# Patient Record
Sex: FEMALE | Race: WHITE | Employment: UNEMPLOYED | URBAN - METROPOLITAN AREA
[De-identification: names, ages, dates, MRNs, and addresses within clinical notes are randomized per-mention and may not be internally consistent; named-entity substitution may affect disease eponyms.]

---

## 2017-08-11 ENCOUNTER — OFFICE VISIT (OUTPATIENT)
Dept: INTERNAL MEDICINE CLINIC | Age: 67
End: 2017-08-11

## 2017-08-11 VITALS
HEART RATE: 87 BPM | OXYGEN SATURATION: 99 % | TEMPERATURE: 98 F | BODY MASS INDEX: 25.95 KG/M2 | RESPIRATION RATE: 18 BRPM | HEIGHT: 62 IN | SYSTOLIC BLOOD PRESSURE: 176 MMHG | DIASTOLIC BLOOD PRESSURE: 76 MMHG | WEIGHT: 141 LBS

## 2017-08-11 DIAGNOSIS — Z79.890 HORMONE REPLACEMENT THERAPY: ICD-10-CM

## 2017-08-11 DIAGNOSIS — R29.898 PELVIC GIRDLE WEAKNESS: ICD-10-CM

## 2017-08-11 DIAGNOSIS — B00.1 HERPES LABIALIS: ICD-10-CM

## 2017-08-11 DIAGNOSIS — R19.5 LOOSE STOOLS: ICD-10-CM

## 2017-08-11 DIAGNOSIS — I10 ESSENTIAL HYPERTENSION: ICD-10-CM

## 2017-08-11 DIAGNOSIS — Z23 ENCOUNTER FOR IMMUNIZATION: Primary | ICD-10-CM

## 2017-08-11 DIAGNOSIS — Z87.440 HISTORY OF RECURRENT UTIS: ICD-10-CM

## 2017-08-11 RX ORDER — MEDROXYPROGESTERONE ACETATE 2.5 MG/1
2.5 TABLET ORAL DAILY
COMMUNITY

## 2017-08-11 RX ORDER — ESTRADIOL 0.5 MG/1
TABLET ORAL DAILY
COMMUNITY

## 2017-08-11 RX ORDER — LOSARTAN POTASSIUM 100 MG/1
100 TABLET ORAL DAILY
COMMUNITY
Start: 2017-07-18

## 2017-08-11 RX ORDER — CHOLECALCIFEROL (VITAMIN D3) 25 MCG
2000 TABLET,CHEWABLE ORAL DAILY
COMMUNITY

## 2017-08-11 RX ORDER — CLOTRIMAZOLE AND BETAMETHASONE DIPROPIONATE 10; .64 MG/G; MG/G
CREAM TOPICAL
COMMUNITY
Start: 2017-05-24 | End: 2017-10-09

## 2017-08-11 RX ORDER — ESCITALOPRAM OXALATE 10 MG/1
10 TABLET ORAL DAILY
COMMUNITY
Start: 2017-07-12

## 2017-08-11 RX ORDER — ZOLPIDEM TARTRATE 10 MG/1
10 TABLET ORAL ONCE
Status: ON HOLD | COMMUNITY
Start: 2017-07-15 | End: 2018-01-25

## 2017-08-11 RX ORDER — LOSARTAN POTASSIUM 50 MG/1
TABLET ORAL
COMMUNITY
Start: 2017-05-06 | End: 2017-08-11 | Stop reason: ALTCHOICE

## 2017-08-11 RX ORDER — VALACYCLOVIR HYDROCHLORIDE 1 G/1
TABLET, FILM COATED ORAL DAILY
COMMUNITY
Start: 2017-05-22

## 2017-08-11 RX ORDER — CEPHALEXIN 250 MG/1
CAPSULE ORAL
Refills: 0 | COMMUNITY
Start: 2017-05-10 | End: 2017-10-09

## 2017-08-11 NOTE — LETTER
9/7/2017 7:53 AM 
 
Ms. Maddox [address] [facility] [address] [address] Dear Varsha: 
 
Please find your most recent results below. Resulted Orders CK Result Value Ref Range Creatine Kinase,Total 289 (H) 24 - 173 U/L Narrative Performed at:  [address]  [id number] : Rose Mary Han MD, Phone:  [phone] ALDOLASE Result Value Ref Range Aldolase 4.7 3.3 - 10.3 U/L Narrative Performed at:  [address]  [id number] : Rose Mary Han MD, Phone:  [phone] SED RATE (ESR) Result Value Ref Range Sed rate (ESR) 3 0 - 40 mm/hr Narrative Performed at:  [address]  [id number] : Rose Mary Han MD, Phone:  [phone] RECOMMENDATIONS: 
We have attempted to call you a few times. Please see the note below. The CK muscle enzyme breakdown is slightly elevated and therefore I recommend you to see a  neurologist for further investigation of possible myopathy-.  
 
Please call me if you have any questions: [phone] Sincerely, Jaleesa Pedraza MD

## 2017-08-11 NOTE — PATIENT INSTRUCTIONS
-Purchase a blood pressure cuff that goes around your upper arm and check blood pressure at least 3 times per week. Write down your numbers for review. Check blood pressure in the morning and evening. Rest for 5 minutes with feet elevated and arm resting on a table (at mid-chest level) when checking blood pressure    -Exercise 30-60 minutes most days of the week, preferably with a mix of cardiovascular and strength training. Exercise can improve blood pressure, even if you do not lose weight!    -Limit alcohol intake to less than 2-3 drinks daily     -Avoid tobacco products    -Avoid caffeine, energy drinks, stimulants    -DASH diet - includes fruits, vegetables, fiber, and less than 2000 mg sodium (salt) daily. Try to eat less than 8605-3333 calories daily.  Limit fat intake.     -Avoid non-steroidal inflammatory medications (NSAIDS) such as ibuprofen, advil, motrin, aleve, and naproxyn as these may increase blood pressure if used long-term    -Avoid OTC decongestants such as pseudopherine, phenylephrine, Afrin    -------------------------  Take probiotics

## 2017-08-11 NOTE — PROGRESS NOTES
Immunization administered 8/11/2017 by Deandre Isidro LPN with guardian's consent. Patient tolerated procedure well. No reactions noted.

## 2017-08-11 NOTE — PROGRESS NOTES
Ms. Steven Bender is a new patient who is here to establish care. CC:  Establish Care (new patient)  Hypertension  Changes in bowel movement  Depression  Difficulty going up the stairs     HPI:    HTN: at age of 62 started on blood pressure medication. Sent to cardiologist due to abnormal EKG. Has a RBBB. Had a recent stress test - persantine and had a normal stress test.  Dr James Sick against adding another blood pressure medication  Losartan 100mg daily and tolerating medication well  Does not check blood pressure at home   176/76 today  At urologist [facility] week 133/69  Discussed DASH diet  Patient does lifestyle changes - exercise ( walks) and eats low-salt healthy /    Chronic UTIs: Saw urologist. Sex related UTIs. Takes keflex as needed. Takes cranberry  Significant other lives in [place] half of the year and no UTIs while he is awake  Followed by Dr Zi Robbins - has cystoscopy planned     lexapro 10mg : started with depression . Started on early 46s and helped tremendously. Situational depression. 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 Has tried to go off of it and cannot. Has vaginal US scheduled - and normal  Lawyer Friedman is gynecologist      Trouble swallowing at times: triggered by not chewing or taking a big pill, at times \" a little bile comes up\" and then wont happen for months. Bowels have changed: every morning has loose bowel no matter what eats. Can also have a normal bowel. Always gone by non. Change in the last 9 months ago and increasing  No abdominal pain weight loss, or blood in the stool  Normal colonoscopy 4 years ago. Mother had colon cancer   On chronic antibiotics - may have bacterial overgrowth     AT times struggles with going up the stairs: feels stiffness and pain in buttocks -able to get up from a chair without assistance.      Review of systems:  Constitutional: negative for fever, chills, weight loss, night sweats   Eyes : negative for vision changes, eye pain and discharge  Nose and Throat: negative for tinnitus, sore throat   Cardiovascular: negative for chest pain, palpitations and shortness of breath  Respiratory: negative for shortness of breath, cough and wheezing   Gastroinstestinal: See HPI  Musculoskeletal: See HPI  Genitourinary: negative for dysuria, nocturia, polyuria and hematuria   Neurologic: Negative for focal weakness, numbness or incoordination  Skin: negative for rash, pruritus  Hematologic: negative for easy bruising      Past Medical History:   Diagnosis Date    Herpes labialis     History of recurrent UTIs     sex related    Hormone replacement therapy     HTN (hypertension)     RBBB         History reviewed. No pertinent surgical history. Not on File    No current outpatient prescriptions on file prior to visit. No current facility-administered medications on file prior to visit. family history includes Cancer in her mother. Social History     Social History    Marital status:      Spouse name: N/A    Number of children: N/A    Years of education: N/A     Occupational History    Not on file. Social History Main Topics    Smoking status: Never Smoker    Smokeless tobacco: Never Used    Alcohol use Yes    Drug use: No    Sexual activity: Yes     Partners: Male     Other Topics Concern    Not on file     Social History Narrative    No narrative on file       Visit Vitals    /76 (BP 1 Location: Right arm, BP Patient Position: Sitting)    Pulse 87    Temp 98 °F (36.7 °C) (Oral)    Resp 18    Ht 5' 2\" (1.575 m)    Wt 141 lb (64 kg)    SpO2 99%    BMI 25.79 kg/m2     General:  Well appearing female no acute distress  HEENT:   PERRL,normal conjunctiva. External ear and canals normal, TMs normal.  Hearing normal to voice. Nose without edema or discharge, normal septum. Lips, teeth, gums normal.  Oropharynx: no erythema, no exudates, no lesions, normal tongue. Neck:  Supple. Thyroid normal size, nontender, without nodules. No carotid bruit. No masses or lymphadenopathy  Respiratory: no respiratory distress,  no wheezing, no rhonchi, no rales. No chest wall tenderness. Cardiovascular:  RRR, normal S1S2, no murmur. Gastrointestinal: normal bowel sounds, soft, nontender, without masses. No hepatosplenomegaly. Extremities +2 pulses, no edema, normal sensation   Musculoskeletal:  Normal gait. Normal digits and nails. Normal strength and tone, no atrophy, and no abnormal movement. Skin:  No rash, no lesions, no ulcers. Skin warm, normal turgor, without induration or nodules. Neuro:  A and OX4, fluent speech, cranial nerves normal 2-12. Sensation normal to light touch. DTR symmetrical  Able to get up from the chair without difficulty  Psych:  Normal affect      No results found for: WBC, WBCLT, HGBPOC, HGB, HGBP, HCTPOC, HCT, PHCT, RBCH, PLT, MCV, HGBEXT, HCTEXT, PLTEXT, HGBEXT, HCTEXT, PLTEXT  No results found for: NA, K, CL, CO2, AGAP, GLU, BUN, CREA, BUCR, GFRAA, GFRNA, CA, GFRAA  No results found for: CHOL, CHOLPOCT, CHOLX, CHLST, CHOLV, HDL, HDLPOC, LDL, LDLCPOC, LDLC, DLDLP, VLDLC, VLDL, TGLX, TRIGL, TRIGP, TGLPOCT, CHHD, CHHDX  No results found for: TSH, TSH2, TSH3, TSHP, TSHEXT, TSHEXT  No results found for: HBA1C, HGBE8, WPN6DPCK, CVI5PPSU, HVE3FLPN  No results found for: VITD3, XQVID2, XQVID3, XQVID, VD3RIA      [facility] was normal   Glucose 83   3 D mammogram done on Wednesday   Colonoscopy done last one 2012 and normal                Assessment and Plan:     1. History of recurrent UTIs associated with sexual intercourse  -Takes Keflex as needed. -Denies current symptoms of UTI  -Has a cystoscopy scheduled with urologist    2.  Encounter for immunization  - varicella zoster vacine live (ZOSTAVAX) 19,400 unit/0.65 mL susr injection; 1 Vial by SubCUTAneous route once for 1 dose. Dispense: 0.65 mL; Refill: 0  - PNEUMOCOCCAL CONJ VACCINE 13 VALENT IM  - IL IMMUNIZ ADMIN,1 SINGLE/COMB VAC/TOXOID    3. Essential hypertension  -Blood pressure is elevated today but reports it was normal at urologist office last week  -recommend checking blood pressure with goal of less than  130/85 on average. Follow lo sodium diet. Given DASH diet guidelines. Recommend cardiovascular exercise regularly. Work on weight reduction. Call with blood pressure readings. 4. Hormone replacement therapy  -Prescribed by gynecologist/patient aware of increased risk of clots    5. Herpes labialis  -Takes Valtrex as needed  -Discussed she must not of taking Valtrex in the week prior to getting shingles vaccine    6. Loose stools  -Family history maternal of colon cancer at the age of 76. Patient reports normal colonoscopy 4 years ago/ given change in stool habits I would like her to see a gastroenterologist.  Possibly bacterial overgrowth given recurrent use of Keflex for UTI  - REFERRAL TO GASTROENTEROLOGY    7. Pelvic girdle weakness  -No weakness noted on exam the patient has difficulty going up the stairs. Obtaining labs to rule out myopathy  - CK  - ALDOLASE  - SED RATE (ESR)  - REFERRAL TO PHYSICAL THERAPY    I have requested recent labs done at gyn office and have requested recent mammogram  and last colonoscopy    I spent 60 minutes in the care of this patient with greater than 30 minutes was spent face-to-face with patient discussing medical problems and plan of care  Follow-up Disposition:  Return in about 6 months (around 2/11/2018) for pelvic girdle weakness, HTN .      Haleigh Steele MD

## 2017-08-11 NOTE — MR AVS SNAPSHOT
Visit Information Date & Time Provider Department Dept. Phone Encounter #  
 8/11/2017 11:30 AM Salena Biswas [address] [place] [address],[address] [phone] [id number] Follow-up Instructions Return in about 6 months (around 2/11/2018) for pelvic girdle weakness, HTN . Upcoming Health Maintenance Date Due  
 BREAST CANCER SCRN MAMMOGRAM 9/7/2001 FOBT Q 1 YEAR AGE 50-75 9/7/2001 ZOSTER VACCINE AGE 60> 7/7/2011 GLAUCOMA SCREENING Q2Y 9/7/2016 OSTEOPOROSIS SCREENING (DEXA) 9/7/2016 Pneumococcal 65+ Low/Medium Risk (2 of 2 - PPSV23) 9/7/2016 MEDICARE YEARLY EXAM 9/7/2016 INFLUENZA AGE 9 TO ADULT 8/1/2017 DTaP/Tdap/Td series (2 - Td) 8/10/2022 Allergies as of 8/11/2017  Review Complete On: 8/11/2017 By: Chelsi Leon Not on File Current Immunizations  Never Reviewed Name Date Pneumococcal Conjugate (PCV-13)  Incomplete Not reviewed this visit You Were Diagnosed With   
  
 Codes Comments Encounter for immunization    -  Primary ICD-10-CM: Z25 ICD-9-CM: V03.89 History of recurrent UTIs     ICD-10-CM: Z87.440 ICD-9-CM: V13.02 Essential hypertension     ICD-10-CM: I10 
ICD-9-CM: 401.9 Hormone replacement therapy     ICD-10-CM: P85.065 ICD-9-CM: V07.4 Herpes labialis     ICD-10-CM: B00.1 ICD-9-CM: 054.9 Loose stools     ICD-10-CM: R19.5 ICD-9-CM: 787.7 Pelvic girdle weakness     ICD-10-CM: R29.898 ICD-9-CM: 719.65 Vitals BP Pulse Temp Resp Height(growth percentile) Weight(growth percentile) 176/76 (BP 1 Location: Right arm, BP Patient Position: Sitting) 87 98 °F (36.7 °C) (Oral) 18 5' 2\" (1.575 m) 141 lb (64 kg) SpO2 BMI OB Status Smoking Status 99% 25.79 kg/m2 Menopause Never Smoker BMI and BSA Data Body Mass Index Body Surface Area 25.79 kg/m 2 1.67 m 2 Your Updated Medication List  
  
   
This list is accurate as of: 8/11/17 12:41 PM.  Always use your most recent med list.  
  
  
  
  
 cephALEXin 250 mg capsule Commonly known as:  Liliana Alvarez TK ONE C PO  ONCE D  
  
 clotrimazole-betamethasone topical cream  
Commonly known as:  LOTRISONE  
  
 cranberry extract 450 mg Tab tablet Take 450 mg by mouth two (2) times a day. cyanocobalamin (vitamin B-12) 3,000 mcg Lozg  
by SubLINGual route. escitalopram oxalate 10 mg tablet Commonly known as:  Louvella Milo Take 10 mg by mouth daily. estradiol 0.5 mg tablet Commonly known as:  ESTRACE Take  by mouth daily. losartan 100 mg tablet Commonly known as:  COZAAR Take 100 mg by mouth daily. medroxyPROGESTERone 2.5 mg tablet Commonly known as:  PROVERA Take 2.5 mg by mouth daily. triamcinolone-dimethicone 0.1-5 % Ktoc  
by Apply Externally route. valACYclovir 1 gram tablet Commonly known as:  VALTREX  
daily. varicella zoster vacine live 19,400 unit/0.65 mL Susr injection Commonly known as:  ZOSTAVAX  
1 Vial by SubCUTAneous route once for 1 dose. zolpidem 10 mg tablet Commonly known as:  AMBIEN Take 10 mg by mouth once. Prescriptions Printed Refills  
 varicella zoster vacine live (ZOSTAVAX) 19,400 unit/0.65 mL susr injection 0 Si Vial by SubCUTAneous route once for 1 dose. Class: Print Route: SubCUTAneous We Performed the Following ALDOLASE [id number] CPT(R)] CK [id number] CPT(R)] PNEUMOCOCCAL CONJ VACCINE 13 VALENT IM [id number] CPT(R)] MD IMMUNIZ ADMIN,1 SINGLE/COMB VAC/TOXOID [id number] CPT(R)] REFERRAL TO GASTROENTEROLOGY [MFY97 Custom] Comments:  
 Please evaluate patient for  Increased frequency stools. Mother hx of colon cancer REFERRAL TO PHYSICAL THERAPY [GLI53 Custom] SED RATE (ESR) [id number] CPT(R)] Follow-up Instructions Return in about 6 months (around 2018) for pelvic girdle weakness, HTN . Referral Information Referral ID Referred By Referred To [id number] APPANUSHA Segura Not Available Visits Status Start Date End Date 1 New Request 8/11/17 8/11/18 If your referral has a status of pending review or denied, additional information will be sent to support the outcome of this decision. Referral ID Referred By Referred To  
 [id number] APPAgnes Hemphill 1, [address] [facility] [address] , [address] Phone: [phone] Fax: [phone] Visits Status Start Date End Date 1 New Request 8/11/17 8/11/18 If your referral has a status of pending review or denied, additional information will be sent to support the outcome of this decision. Patient Instructions   
-Purchase a blood pressure cuff that goes around your upper arm and check blood pressure at least 3 times per week. Write down your numbers for review. Check blood pressure in the morning and evening. Rest for 5 minutes with feet elevated and arm resting on a table (at mid-chest level) when checking blood pressure 
 
-Exercise 30-60 minutes most days of the week, preferably with a mix of cardiovascular and strength training. Exercise can improve blood pressure, even if you do not lose weight! 
 
-Limit alcohol intake to less than 2-3 drinks daily -Avoid tobacco products -Avoid caffeine, energy drinks, stimulants -DASH diet - includes fruits, vegetables, fiber, and less than 2000 mg sodium (salt) daily. Try to eat less than 4602-2566 calories daily. Limit fat intake.  
 
-Avoid non-steroidal inflammatory medications (NSAIDS) such as ibuprofen, advil, motrin, aleve, and naproxyn as these may increase blood pressure if used long-term 
 
-Avoid OTC decongestants such as pseudopherine, phenylephrine, Afrin 
 
------------------------- Take probiotics Introducing hospitals & HEALTH SERVICES!    
 [employer] introduces [employer] patient portal. Now you can access parts of your medical record, email your doctor's office, and request medication refills online. 1. In your internet browser, go to [email or web address] 2. Click on the First Time User? Click Here link in the Sign In box. You will see the New Member Sign Up page. 3. Enter your [employer] Access Code exactly as it appears below. You will not need to use this code after youve completed the sign-up process. If you do not sign up before the expiration date, you must request a new code. · [employer] Access Code: [email or web address] Expires: 11/9/2017 12:41 PM 
 
4. Enter the last four digits of your Social Security Number (xxxx) and Date of Birth (mm/dd/yyyy) as indicated and click Submit. You will be taken to the next sign-up page. 5. Create a MoonClerk ID. This will be your [employer]erk login ID and cannot be changed, so think of one that is secure and easy to remember. 6. Create a [employer]erk password. You can change your password at any time. 7. Enter your Password Reset Question and Answer. This can be used at a later time if you forget your password. 8. Enter your e-mail address. You will receive e-mail notification when new information is available in [address] 9. Click Sign Up. You can now view and download portions of your medical record. 10. Click the Download Summary menu link to download a portable copy of your medical information. If you have questions, please visit the Frequently Asked Questions section of the [employer] website. Remember, [employer] is NOT to be used for urgent needs. For medical emergencies, dial 911. Now available from your iPhone and Android! Please provide this summary of care documentation to your next provider. Your primary care clinician is listed as FELY HILL [address] [place] [address] Ave. If you have any questions after today's visit, please call [phone].

## 2017-08-11 NOTE — PROGRESS NOTES
Chief Complaint   Patient presents with   [facility] Establish Care     new patient     1. Have you been to the ER, urgent care clinic since your last visit? Hospitalized since your last visit? No    2. Have you seen or consulted any other health care providers outside of the [facility] since your last visit? Include any pap smears or colon screening.  No

## 2017-08-15 ENCOUNTER — APPOINTMENT (OUTPATIENT)
Dept: INTERNAL MEDICINE CLINIC | Age: 67
End: 2017-08-15

## 2017-08-15 ENCOUNTER — HOSPITAL ENCOUNTER (OUTPATIENT)
Dept: LAB | Age: 67
Discharge: HOME | End: 2017-08-15
Payer: MEDICARE

## 2017-08-15 PROCEDURE — 85651 RBC SED RATE NONAUTOMATED: CPT

## 2017-08-15 PROCEDURE — 82085 ASSAY OF ALDOLASE: CPT

## 2017-08-15 PROCEDURE — 82550 ASSAY OF CK (CPK): CPT

## 2017-08-15 PROCEDURE — 36415 COLL VENOUS BLD VENIPUNCTURE: CPT

## 2017-08-16 LAB
ALDOLASE SERPL-CCNC: 4.7 U/L (ref 3.3–10.3)
CK SERPL-CCNC: 289 U/L (ref 24–173)
ERYTHROCYTE [SEDIMENTATION RATE] IN BLOOD BY WESTERGREN METHOD: 3 MM/HR (ref 0–40)

## 2017-08-16 NOTE — PROGRESS NOTES
Please notify patient that CK muscle enzyme breakdown is slightly elevated and therefore I recommend patient to see neurologist for further investigation of possible myopathy- as patient complained of difficulty going up the steps during the visit.

## 2017-10-09 ENCOUNTER — OFFICE VISIT (OUTPATIENT)
Dept: NEUROLOGY | Age: 67
End: 2017-10-09

## 2017-10-09 VITALS
BODY MASS INDEX: 26.61 KG/M2 | DIASTOLIC BLOOD PRESSURE: 80 MMHG | SYSTOLIC BLOOD PRESSURE: 138 MMHG | HEIGHT: 62 IN | OXYGEN SATURATION: 98 % | WEIGHT: 144.6 LBS

## 2017-10-09 DIAGNOSIS — M62.81 PROXIMAL LIMB MUSCLE WEAKNESS: Primary | ICD-10-CM

## 2017-10-09 NOTE — PATIENT INSTRUCTIONS
1. MRI lumbar spine  2. Blood work  3. Scheduled for EMG/nerve conduction study       A Healthy Lifestyle: Care Instructions  Your Care Instructions  A healthy lifestyle can help you feel good, stay at a healthy weight, and have plenty of energy for both work and play. A healthy lifestyle is something you can share with your whole family. A healthy lifestyle also can lower your risk for serious health problems, such as high blood pressure, heart disease, and diabetes. You can follow a few steps listed below to improve your health and the health of your family. Follow-up care is a key part of your treatment and safety. Be sure to make and go to all appointments, and call your doctor if you are having problems. Its also a good idea to know your test results and keep a list of the medicines you take. How can you care for yourself at home? · Do not eat too much sugar, fat, or fast foods. You can still have dessert and treats now and then. The goal is moderation. · Start small to improve your eating habits. Pay attention to portion sizes, drink less juice and soda pop, and eat more fruits and vegetables. ¨ Eat a healthy amount of food. A 3-ounce serving of meat, for example, is about the size of a deck of cards. Fill the rest of your plate with vegetables and whole grains. ¨ Limit the amount of soda and sports drinks you have every day. Drink more water when you are thirsty. ¨ Eat at least 5 servings of fruits and vegetables every day. It may seem like a lot, but it is not hard to reach this goal. A serving or helping is 1 piece of fruit, 1 cup of vegetables, or 2 cups of leafy, raw vegetables. Have an apple or some carrot sticks as an afternoon snack instead of a candy bar. Try to have fruits and/or vegetables at every meal.  · Make exercise part of your daily routine. You may want to start with simple activities, such as walking, bicycling, or slow swimming. Try to be active 30 to 60 minutes every day.  You do not need to do all 30 to 60 minutes all at once. For example, you can exercise 3 times a day for 10 or 20 minutes. Moderate exercise is safe for most people, but it is always a good idea to talk to your doctor before starting an exercise program.  · Keep moving. Mackenzie Knuckles the lawn, work in the garden, or Lela. Take the stairs instead of the elevator at work. · If you smoke, quit. People who smoke have an increased risk for heart attack, stroke, cancer, and other lung illnesses. Quitting is hard, but there are ways to boost your chance of quitting tobacco for good. ¨ Use nicotine gum, patches, or lozenges. ¨ Ask your doctor about stop-smoking programs and medicines. ¨ Keep trying. In addition to reducing your risk of diseases in the future, you will notice some benefits soon after you stop using tobacco. If you have shortness of breath or asthma symptoms, they will likely get better within a few weeks after you quit. · Limit how much alcohol you drink. Moderate amounts of alcohol (up to 2 drinks a day for men, 1 drink a day for women) are okay. But drinking too much can lead to liver problems, high blood pressure, and other health problems. Family health  If you have a family, there are many things you can do together to improve your health. · Eat meals together as a family as often as possible. · Eat healthy foods. This includes fruits, vegetables, lean meats and dairy, and whole grains. · Include your family in your fitness plan. Most people think of activities such as jogging or tennis as the way to fitness, but there are many ways you and your family can be more active. Anything that makes you breathe hard and gets your heart pumping is exercise. Here are some tips:  ¨ Walk to do errands or to take your child to school or the bus. ¨ Go for a family bike ride after dinner instead of watching TV. Where can you learn more? Go to http://patricia-nish.info/.   Enter E668 in the search box to learn more about \"A Healthy Lifestyle: Care Instructions. \"  Current as of: July 26, 2016  Content Version: 11.3  © 4337-6133 [employer]. Care instructions adapted under license by [employer] (which disclaims liability or warranty for this information). If you have questions about a medical condition or this instruction, always ask your healthcare professional. [address] any warranty or liability for your use of this information.

## 2017-10-09 NOTE — MR AVS SNAPSHOT
Visit Information Date & Time Provider Department Dept. Phone Encounter #  
 10/9/2017  9:00 AM Refugio Tyson MD [facility]rology [facility] at [facility] [phone] [id number] Upcoming Health Maintenance Date Due FOBT Q 1 YEAR AGE 50-75 9/7/2001 ZOSTER VACCINE AGE 60> 7/7/2011 GLAUCOMA SCREENING Q2Y 9/7/2016 OSTEOPOROSIS SCREENING (DEXA) 9/7/2016 MEDICARE YEARLY EXAM 9/7/2016 INFLUENZA AGE 9 TO ADULT 8/1/2017 Pneumococcal 65+ Low/Medium Risk (2 of 2 - PPSV23) 8/11/2018 BREAST CANCER SCRN MAMMOGRAM 8/11/2019 DTaP/Tdap/Td series (2 - Td) 8/10/2022 Allergies as of 10/9/2017  Review Complete On: 10/9/2017 By: Refugio Tyson MD  
  
 Severity Noted Reaction Type Reactions Sulfa (Sulfonamide Antibiotics)  10/09/2017    Itching Current Immunizations  Never Reviewed Name Date Pneumococcal Conjugate (PCV-13) 8/11/2017  1:01 PM  
  
 Not reviewed this visit You Were Diagnosed With   
  
 Codes Comments Proximal limb muscle weakness    -  Primary ICD-10-CM: M62.89 ICD-9-CM: 728.87 Vitals BP Height(growth percentile) Weight(growth percentile) SpO2 BMI OB Status 138/80 5' 2\" (1.575 m) 144 lb 9.6 oz (65.6 kg) 98% 26.45 kg/m2 Menopause Smoking Status Never Smoker BMI and BSA Data Body Mass Index Body Surface Area  
 26.45 kg/m 2 1.69 m 2 Preferred Pharmacy Pharmacy Name Phone [facility] [address] [place] [address] [phone] Your Updated Medication List  
  
   
This list is accurate as of: 10/9/17 10:11 AM.  Always use your most recent med list.  
  
  
  
  
 cranberry extract 450 mg Tab tablet Take 450 mg by mouth two (2) times a day. cyanocobalamin (vitamin B-12) 3,000 mcg Lozg  
by SubLINGual route. escitalopram oxalate 10 mg tablet Commonly known as:  Arty [place] Take 10 mg by mouth daily. estradiol 0.5 mg tablet Commonly known as:  ESTRACE Take  by mouth daily. losartan 100 mg tablet Commonly known as:  COZAAR Take 100 mg by mouth daily. medroxyPROGESTERone 2.5 mg tablet Commonly known as:  PROVERA Take 2.5 mg by mouth daily. triamcinolone-dimethicone 0.1-5 % Ktoc  
by Apply Externally route. valACYclovir 1 gram tablet Commonly known as:  VALTREX  
daily. zolpidem 10 mg tablet Commonly known as:  AMBIEN Take 10 mg by mouth once. We Performed the Following CK [id number] CPT(R)] MYASTHENIA GRAVIS EVALUATION [id number] CPT(R)] To-Do List   
 10/09/2017 Imaging:  MRI LUMB SPINE WO CONT Patient Instructions 1. MRI lumbar spine 2. Blood work 3. Scheduled for EMG/nerve conduction study A Healthy Lifestyle: Care Instructions Your Care Instructions A healthy lifestyle can help you feel good, stay at a healthy weight, and have plenty of energy for both work and play. A healthy lifestyle is something you can share with your whole family. A healthy lifestyle also can lower your risk for serious health problems, such as high blood pressure, heart disease, and diabetes. You can follow a few steps listed below to improve your health and the health of your family. Follow-up care is a key part of your treatment and safety. Be sure to make and go to all appointments, and call your doctor if you are having problems. Its also a good idea to know your test results and keep a list of the medicines you take. How can you care for yourself at home? · Do not eat too much sugar, fat, or fast foods. You can still have dessert and treats now and then. The goal is moderation. · Start small to improve your eating habits. Pay attention to portion sizes, drink less juice and soda pop, and eat more fruits and vegetables. ¨ Eat a healthy amount of food.  A 3-ounce serving of meat, for example, is about the size of a deck of cards. Fill the rest of your plate with vegetables and whole grains. ¨ Limit the amount of soda and sports drinks you have every day. Drink more water when you are thirsty. ¨ Eat at least 5 servings of fruits and vegetables every day. It may seem like a lot, but it is not hard to reach this goal. A serving or helping is 1 piece of fruit, 1 cup of vegetables, or 2 cups of leafy, raw vegetables. Have an apple or some carrot sticks as an afternoon snack instead of a candy bar. Try to have fruits and/or vegetables at every meal. 
· Make exercise part of your daily routine. You may want to start with simple activities, such as walking, bicycling, or slow swimming. Try to be active 30 to 60 minutes every day. You do not need to do all 30 to 60 minutes all at once. For example, you can exercise 3 times a day for 10 or 20 minutes. Moderate exercise is safe for most people, but it is always a good idea to talk to your doctor before starting an exercise program. 
· Keep moving. Maytommy [employer]Â®s the lawn, work in the garden, or [employer]. Take the stairs instead of the elevator at work. · If you smoke, quit. People who smoke have an increased risk for heart attack, stroke, cancer, and other lung illnesses. Quitting is hard, but there are ways to boost your chance of quitting tobacco for good. ¨ Use nicotine gum, patches, or lozenges. ¨ Ask your doctor about stop-smoking programs and medicines. ¨ Keep trying. In addition to reducing your risk of diseases in the future, you will notice some benefits soon after you stop using tobacco. If you have shortness of breath or asthma symptoms, they will likely get better within a few weeks after you quit. · Limit how much alcohol you drink. Moderate amounts of alcohol (up to 2 drinks a day for men, 1 drink a day for women) are okay. But drinking too much can lead to liver problems, high blood pressure, and other health problems. Family health If you have a family, there are many things you can do together to improve your health. · Eat meals together as a family as often as possible. · Eat healthy foods. This includes fruits, vegetables, lean meats and dairy, and whole grains. · Include your family in your fitness plan. Most people think of activities such as jogging or tennis as the way to fitness, but there are many ways you and your family can be more active. Anything that makes you breathe hard and gets your heart pumping is exercise. Here are some tips: 
¨ Walk to do errands or to take your child to school or the bus. ¨ Go for a family bike ride after dinner instead of watching TV. Where can you learn more? Go to http://patricia-nish.info/. Enter V370 in the search box to learn more about \"A Healthy Lifestyle: Care Instructions. \" Current as of: July 26, 2016 Content Version: 11.3 © 3897-8412 [employer]. Care instructions adapted under license by [employer] (which disclaims liability or warranty for this information). If you have questions about a medical condition or this instruction, always ask your healthcare professional. [place] [address] any warranty or liability for your use of this information. Introducing [facility]! Sachin Webster introduces ImpactRx patient portal. Now you can access parts of your medical record, email your doctor's office, and request medication refills online. 1. In your internet browser, go to [email or web address] 2. Click on the First Time User? Click Here link in the Sign In box. You will see the New Member Sign Up page. 3. Enter your ImpactRx Access Code exactly as it appears below. You will not need to use this code after youve completed the sign-up process. If you do not sign up before the expiration date, you must request a new code. · ImpactRx Access Code: [email or web address] Expires: 11/9/2017 12:41 PM 
 
 4. Enter the last four digits of your Social Security Number (xxxx) and Date of Birth (mm/dd/yyyy) as indicated and click Submit. You will be taken to the next sign-up page. 5. Create a Cheggin ID. This will be your Cheggin login ID and cannot be changed, so think of one that is secure and easy to remember. 6. Create a Cheggin password. You can change your password at any time. 7. Enter your Password Reset Question and Answer. This can be used at a later time if you forget your password. 8. Enter your e-mail address. You will receive e-mail notification when new information is available in [address] 9. Click Sign Up. You can now view and download portions of your medical record. 10. Click the Download Summary menu link to download a portable copy of your medical information. If you have questions, please visit the Frequently Asked Questions section of the Cheggin website. Remember, Cheggin is NOT to be used for urgent needs. For medical emergencies, dial 911. Now available from your iPhone and Android! Please provide this summary of care documentation to your next provider. Your primary care clinician is listed as FELY HILL [address] [place], [place] [address]. If you have any questions after today's visit, please call [phone].

## 2017-10-09 NOTE — PROGRESS NOTES
Neurology Consultation Note  REFERRED BY:  MD Zulay    10/09/17    Chief Complaint   Patient presents with    New Patient     gridele muscle weakness/elevated CK       HISTORY OF PRESENT ILLNESS  Nancy Lee is a 78 y.o. female who presented to the neurology office for management of progressive weakness in proximal muscles of the lower extremities. The patient states that she used to live on the third floor around 3 years ago but started noticing some weakness 2 years ago and in the last 1 year it has gotten even more significant. It has been gradually progressing. She does not have any problems walking straight on a level floor but when she has to go up the stairs she starts having strain in her lower back and she has to pull herself up using the handrail. She notices weakness in her lower extremities as well. She denies any history of diabetes and there is no associated numbness in the lower extremities. She did have muscle enzymes and they were marginally elevated. Current Outpatient Prescriptions   Medication Sig    escitalopram oxalate (LEXAPRO) 10 mg tablet Take 10 mg by mouth daily.  losartan (COZAAR) 100 mg tablet Take 100 mg by mouth daily.  valACYclovir (VALTREX) 1 gram tablet daily.  zolpidem (AMBIEN) 10 mg tablet Take 10 mg by mouth once.  estradiol (ESTRACE) 0.5 mg tablet Take  by mouth daily.  medroxyPROGESTERone (PROVERA) 2.5 mg tablet Take 2.5 mg by mouth daily.  cyanocobalamin, vitamin B-12, 3,000 mcg lozg by SubLINGual route.  cranberry extract 450 mg tab tablet Take 450 mg by mouth two (2) times a day. No current facility-administered medications for this visit. Allergies   Allergen Reactions    Sulfa (Sulfonamide Antibiotics) Itching     Past Medical History:   Diagnosis Date    Herpes labialis     History of recurrent UTIs     sex related    Hormone replacement therapy     HTN (hypertension)     RBBB      History reviewed.  No pertinent surgical history. Family History   Problem Relation Age of Onset    Cancer Mother      colon     Social History   Substance Use Topics    Smoking status: Never Smoker    Smokeless tobacco: Never Used    Alcohol use Yes       REVIEW OF SYSTEMS:   A ten system review of constitutional, cardiovascular, respiratory, musculoskeletal, endocrine, skin, SHEENT, genitourinary, psychiatric and neurologic systems was obtained and is unremarkable with the exception of depression, joint pain, muscle pain, muscle weakness, neuropathy, snoring and vertigo     EXAMINATION:   Visit Vitals    /80    Ht 5' 2\" (1.575 m)    Wt 144 lb 9.6 oz (65.6 kg)    SpO2 98%    BMI 26.45 kg/m2        General:   General appearance: Pt is in no acute distress   Distal pulses are preserved  Fundoscopic Exam: Normal    Neurological Examination:   Mental Status: AAO x3. Speech is fluent. Follows commands, has normal fund of knowledge, attention, short term recall, comprehension and insight. Cranial Nerves: Visual fields are full. PERRL, Extraocular movements are full. Facial sensation intact V1- V3. Facial movement intact, symmetric. Hearing intact to conversation. Palate elevates symmetrically. Shoulder shrug symmetric. Tongue midline. Motor: Strength is 5/5 in all 4 ext. No atrophy. Tone: Normal    Sensation: Normal to light touch    Reflexes: DTRs 2+ everywhere except absent at ankles. Plantar responses downgoing. Coordination/Cerebellar: Intact to finger-nose-finger     Gait: Casual gait is normal.     Skin: No significant bruising or lacerations. I also examined her while going up the stairs and she had to make additional effort to go up the stairs and was holding onto her lower back.     Laboratory review:   Results for orders placed or performed in visit on 08/11/17   CK   Result Value Ref Range    Creatine Kinase,Total 289 (H) 24 - 173 U/L   ALDOLASE   Result Value Ref Range    Aldolase 4.7 3.3 - 10.3 U/L   SED RATE (ESR)   Result Value Ref Range    Sed rate (ESR) 3 0 - 40 mm/hr       Imaging review:  8/9/2017  Mammogram  The left implant has new extracapsular silicone. The left implant shows extracapsular rupture. No specific mammographic evidence of malignancy. Documentation review:  I reviewed the notes from the primary care physician from 8/11/2017. The patient does have hypertension. The patient is also complaining of lower extremity weakness. The patient has chronic UTIs. The patient is having trouble swallowing at times. We will do blood work and a 3D mammogram and a colonoscopy. We will start Keflex for recurrent UTI as needed. The patient is having pelvic girdle weakness and has problems going up the stairs. Will get a CK level. And will refer the patient to physical therapy. Assessment/Plan: Leopold Coup is a 78 y.o. female who presented to the neurology office for management of weakness in the proximal muscles of the lower extremities. On examination I did not find any appreciable weakness in the proximal lower extremities. I also took her on the stairs and she was holding onto her back to go up the stairs. She might have mild weakness that is not appreciable on formal testing. I will get MRI of the lumbar spine and an EMG/nerve conduction study to evaluate this further. Also, I have ordered myasthenia panel and will be repeating the CK level. If this workup is negative, we will proceed with proximal lower extremity muscle biopsy to look for any dystrophy/myopathy. ICD-10-CM ICD-9-CM    1. Proximal limb muscle weakness M62.89 728.87 MRI LUMB SPINE WO CONT      MYASTHENIA GRAVIS EVALUATION      CK      Thank you for allowing me to participate in the care of Ms. Weiner. Please feel free to contact me if you have any questions. Anne Galvez MD  Neurologist    CC: Trevon Staton MD  Fax: [phone]    This note was created using voice recognition software. Despite editing, there may be syntax errors. This note will not be viewable in [address]

## 2017-10-11 LAB
ACHR BIND AB SER-SCNC: <0.03 NMOL/L (ref 0–0.24)
CK SERPL-CCNC: 329 U/L (ref 24–173)
STRIA MUS AB TITR SER IF: NEGATIVE {TITER}

## 2017-10-18 ENCOUNTER — TELEPHONE (OUTPATIENT)
Dept: NEUROLOGY | Age: 67
End: 2017-10-18

## 2017-10-19 ENCOUNTER — HOSPITAL ENCOUNTER (OUTPATIENT)
Dept: MRI IMAGING | Age: 67
Discharge: HOME | End: 2017-10-19
Attending: PSYCHIATRY & NEUROLOGY
Payer: MEDICARE

## 2017-10-19 DIAGNOSIS — M62.81 PROXIMAL LIMB MUSCLE WEAKNESS: ICD-10-CM

## 2017-10-19 PROCEDURE — 72148 MRI LUMBAR SPINE W/O DYE: CPT

## 2017-10-20 ENCOUNTER — TELEPHONE (OUTPATIENT)
Dept: NEUROLOGY | Age: 67
End: 2017-10-20

## 2017-10-20 NOTE — TELEPHONE ENCOUNTER
Spoke with patient to let her known Dr. Reyes Sender is working in the hospital and will be back on Monday, and I have sent him a message from Wednesday.

## 2017-10-24 NOTE — TELEPHONE ENCOUNTER
Spoke with patient and per Dr. Cameron Castro he would like to go over your labs and MRI result on November 2, while you are here for your EMG. Patient agreed.

## 2017-10-24 NOTE — TELEPHONE ENCOUNTER
Spoke with patient and per Dr. Vargas Bowens is it okay to go over labs and MRI result on November 2, 2017 when she has her EMG. Patient agreed.

## 2017-10-27 ENCOUNTER — HOSPITAL ENCOUNTER (OUTPATIENT)
Dept: PHYSICAL THERAPY | Age: 67
Discharge: HOME | End: 2017-10-27
Payer: MEDICARE

## 2017-10-27 PROCEDURE — G8978 MOBILITY CURRENT STATUS: HCPCS | Performed by: PHYSICAL THERAPIST

## 2017-10-27 PROCEDURE — 97162 PT EVAL MOD COMPLEX 30 MIN: CPT | Performed by: PHYSICAL THERAPIST

## 2017-10-27 PROCEDURE — 97110 THERAPEUTIC EXERCISES: CPT | Performed by: PHYSICAL THERAPIST

## 2017-10-27 PROCEDURE — 97140 MANUAL THERAPY 1/> REGIONS: CPT | Performed by: PHYSICAL THERAPIST

## 2017-10-27 PROCEDURE — G8979 MOBILITY GOAL STATUS: HCPCS | Performed by: PHYSICAL THERAPIST

## 2017-10-27 NOTE — PROGRESS NOTES
[facility]  [address] ([address]), [address] [place]  Phone: [phone] Fax: [phone]     Plan of Care/Statement of Necessity for Physical Therapy Services  2-15    Patient name: Yariel Rivera  : 1950  Provider#: [id number]  Referral source: Julio Cesar Vaughan MD      Medical/Treatment Diagnosis: r29.898     Prior Hospitalization: see medical history     Comorbidities: DDD, anxiety/panic disorders, high blood pressure  Prior Level of Function: 20 minutes of exercise at least three(3) times a week   Medications: Verified on Patient Summary List  Start of Care: 10/27/2017      Onset Date: chronic (increased symptoms 18 months ago)   The Plan of Care and following information is based on the information from the initial evaluation. Assessment/ key information:The patient presents to clinic with increased low back pain that is most limiting her with stairs and any incline walking. The patient is extremely active and walks up to 3 miles a few times a week and has recently noticed increase fatigue and tiredness in her low back towards the end of her walks. The patient reports decreased SLS balance, hip musculature strength, and stability on the right compared to the left. She presents with right anterior innominate with other signs and symptoms that are consistent with sacroiliac dysfunction. However, the patient also has some inconsistent neurological symptoms that may be due to DDD or other age related degeneration in the lumbar spine. The patient will benefit from guided therapeutic interventions such as therex for strengthening and neuromuscular re-eduction, manual techniques for joint mobility and soft tissue extensibility, and modalities for pain management in order to improve functional mobility with daily activities.       Evaluation Complexity History MEDIUM  Complexity : 1-2 comorbidities / personal factors will impact the outcome/ POC ; Examination MEDIUM Complexity : 3 Standardized tests and measures addressing body structure, function, activity limitation and / or participation in recreation  ;Presentation MEDIUM Complexity : Evolving with changing characteristics  ; Clinical Decision Making MEDIUM Complexity : FOTO score of 26-74  Overall Complexity Rating: MEDIUM    Problem List: pain affecting function, decrease strength, impaired gait/ balance, decrease ADL/ functional abilitiies, decrease activity tolerance and decrease flexibility/ joint mobility   Treatment Plan may include any combination of the following: Therapeutic exercise, Therapeutic activities, Neuromuscular re-education, Physical agent/modality, Gait/balance training, Manual therapy, Patient education, Functional mobility training and Stair training  Patient / Family readiness to learn indicated by: asking questions, trying to perform skills and interest  Persons(s) to be included in education: patient (P)  Barriers to Learning/Limitations: yes;  emotional and other anxiety disorders   Patient Goal (s): \"strengthen pelvic girdle\"  Patient Self Reported Health Status: good  Rehabilitation Potential: good    Short Term Goals: To be accomplish ed in 4 treatments:   Patient will be able to perform SLS > 30 seconds bilaterally in order to improve functional mobility. Patient will be able to walk 3 miles with less then 3/10 pain in order to maintain prior level of function. Long Term Goals: To be accomplished in 16 treatments:   Patient will improve FOTO score to >/= 66. Patient will be able to climb 2 flights of stairs with 0/10 pain and self report of minimal exertion level in order to visit her son's apartment. Patient will be able to stand 2+ hours without pain in order to participate in hobbies like working the [employer] stand. Frequency / Duration: Patient to be seen 2 times per week for 16 treatments.     Patient/ Caregiver education and instruction: activity modification, exercises and other injury pathology     [x]  Plan of care has been reviewed with PTA    G-Codes (GP)  Mobility   Current  CK= 40-59%   Goal  CJ= 20-39%    The severity rating is based on clinical judgment and the FOTO Score. Certification Period: 10/27/17-1/27/18  Jacki Barrera, PT 10/27/2017 1:36 PM    ________________________________________________________________________    I certify that the above Therapy Services are being furnished while the patient is under my care. I agree with the treatment plan and certify that this therapy is necessary.     [de-identified] Signature:____________________  Date:____________Time: _________

## 2017-10-27 NOTE — PROGRESS NOTES
PT INITIAL EVALUATION NOTE - [facility] 2-15    Patient Name: Nanda Dougherty  Date:10/27/2017  : 1950  [x]  Patient  Verified  Payor: Sharmila Lopez / Plan: [place] PART [place] & B / Product Type: Medicare /    In time:12:15pm  Out time: 1:15pm   Total Treatment Time (min): 60  Total Timed Codes (min): 35  1:1 Treatment Time ( [address] only): 60   Visit #: 1     Treatment Area: 9898    SUBJECTIVE  Pain Level (0-10 scale): 0 (7/10 at worst)  Any medication changes, allergies to medications, adverse drug reactions, diagnosis change, or new procedure performed?: [] No    [x] Yes (see summary sheet for update)  Subjective: The patient reports that she is always active and walks frequently, but noticed more difficulty going up inclines. Three years ago she lived on the 3rd floor and she didn't have any difficulty then, but now stairs and inclines are more difficult. It just feels more weak in her back and legs. She really started to notice 18 months ago to have more difficulty, specifically walking up steep Denominational stairs, even initially with the first step. She has to use the handrails to assist. She even has difficulty with house steps. She walks on flat surfaces 3 miles at a time, and only just very recently started to fatigue. She hasn't had any falls. She had blood-work done and had elevated CK levels. She had an MRI recently. She does have history of sciatic issues many years ago but it went away on its own.     OBJECTIVE    Posture:  Scapular protraction bilaterally  Other Observations:  Difficulty heelwalking  Gait and Functional Mobility:  Internal rotation bilaterally (left more than right) during heel strike; occasional scissoring  Palpation: slight tenderness to bilaterally lumbar paraspinals        Lumbar AROM:          R  L    Flexion    100%      Extension   100%      Side Bending   100%  100%    Rotation   100%  100%        LOWER QUARTER   MUSCLE STRENGTH  KEY       R  L  0 - No Contraction  L1, L2 Psoas  4-  4  1 - Trace   L3 Quads  5  5  2 - Poor   L4 Tib Ant  5  5  3 - Fair    L5 EHL  4  5  4 - Good   S1 Peroneals  5  5  5 - Normal   S2 Hams  5  5    Flexibility: tight hip flexors (R more than left)  Mobility Assessment: slight tightness with PROM IR on right hip versus left      MMT:               HIP Ext: 3+/5 on right; 4/5 on left              HIP Abd: 4-/5 on right (p! In right lumbar/SI); 4+/5 on left  Neurological: Reflexes / Sensations: +1 quad bilaterally (hypo); +1 achilles bilaterally  Special Tests:    FABERS: neg   Slump: neg   H.S. SLR: neg        Long Sit: + right anteriorly rotated innominate            25 min Therapeutic Exercise:  [x] See flow sheet :   Rationale: increase ROM, increase strength and improve coordination to improve the patients ability to perform daily activities. 10 min Manual Therapy: MET for right anterior innominate; shotgun pelvis MET     Rationale: decrease pain, increase tissue extensibility and improve functional alignment  to improve the patients ability to perform daily activities and address activity limitations.              With   [x] TE   [] TA   [] neuro   [] other: Patient Education: [x] Review HEP    [x] Progressed/Changed HEP based on:   [x] positioning   [x] body mechanics   [] transfers   [] heat/ice application    [] other:      Other Objective/Functional Measures: FOTO= 54    SLS: R= 30s; L= 3s    Pain Level (0-10 scale) post treatment: 0    ASSESSMENT/Changes in Function:     [x]  See Plan of [facility], PT 10/27/2017  12:15 PM

## 2017-11-02 ENCOUNTER — OFFICE VISIT (OUTPATIENT)
Dept: NEUROLOGY | Age: 67
End: 2017-11-02

## 2017-11-02 VITALS — SYSTOLIC BLOOD PRESSURE: 161 MMHG | DIASTOLIC BLOOD PRESSURE: 88 MMHG | HEART RATE: 80 BPM | OXYGEN SATURATION: 100 %

## 2017-11-02 DIAGNOSIS — M62.81 PROXIMAL LIMB MUSCLE WEAKNESS: Primary | ICD-10-CM

## 2017-11-02 DIAGNOSIS — R74.8 HYPERCKEMIA: ICD-10-CM

## 2017-11-02 NOTE — MR AVS SNAPSHOT
Visit Information Date & Time Provider Department Dept. Phone Encounter #  
 11/2/2017  9:00 AM Isabella Castellon MD Neurology Clinic at [facility] [phone] [id number] Upcoming Health Maintenance Date Due FOBT Q 1 YEAR AGE 50-75 9/7/2001 ZOSTER VACCINE AGE 60> 7/7/2011 GLAUCOMA SCREENING Q2Y 9/7/2016 OSTEOPOROSIS SCREENING (DEXA) 9/7/2016 MEDICARE YEARLY EXAM 9/7/2016 INFLUENZA AGE 9 TO ADULT 8/1/2017 Pneumococcal 65+ Low/Medium Risk (2 of 2 - PPSV23) 8/11/2018 BREAST CANCER SCRN MAMMOGRAM 8/11/2019 DTaP/Tdap/Td series (2 - Td) 8/10/2022 Allergies as of 11/2/2017  Review Complete On: 11/2/2017 By: Isabella Castellon MD  
  
 Severity Noted Reaction Type Reactions Sulfa (Sulfonamide Antibiotics)  10/09/2017    Itching Current Immunizations  Never Reviewed Name Date Pneumococcal Conjugate (PCV-13) 8/11/2017  1:01 PM  
  
 Not reviewed this visit You Were Diagnosed With   
  
 Codes Comments Proximal limb muscle weakness    -  Primary ICD-10-CM: M62.89 ICD-9-CM: 728.87 HyperCKemia     ICD-10-CM: R74.8 ICD-9-CM: 790.5 Vitals BP Pulse SpO2 OB Status Smoking Status 161/88 (BP 1 Location: Left arm, BP Patient Position: Sitting) 80 100% Menopause Never Smoker Preferred Pharmacy Pharmacy Name Phone [facility] [address] [place] [address] [phone] Your Updated Medication List  
  
   
This list is accurate as of: 11/2/17  9:56 AM.  Always use your most recent med list.  
  
  
  
  
 cranberry extract 450 mg Tab tablet Take 450 mg by mouth two (2) times a day. cyanocobalamin (vitamin B-12) 3,000 mcg Lozg  
by SubLINGual route. escitalopram oxalate 10 mg tablet Commonly known as:  Colan Big Take 10 mg by mouth daily. estradiol 0.5 mg tablet Commonly known as:  ESTRACE Take  by mouth daily. losartan 100 mg tablet Commonly known as:  COZAAR Take 100 mg by mouth daily. medroxyPROGESTERone 2.5 mg tablet Commonly known as:  PROVERA Take 2.5 mg by mouth daily. valACYclovir 1 gram tablet Commonly known as:  VALTREX  
daily. zolpidem 10 mg tablet Commonly known as:  AMBIEN Take 10 mg by mouth once. We Performed the Following REFERRAL TO NEUROSURGERY [IMZ06 Custom] Comments:  
 Referral for left vastus lateralis muscle biopsy To-Do List   
 11/07/2017 12:30 PM  
  Appointment with Maninder Gutierrez PT at [address] [facility] ([phone]) Please remember to arrive at the hospital at least 30 minutes prior to your scheduled appointment time. When you come in for your appointment, please be sure to bring the therapy prescription the doctor gave you, your insurance card, and a list of the medicines you are taking. Also, please remember to wear comfortable, loose- fitting clothes. We look forward to seeing you. Referral Information Referral ID Referred By Referred To  
  
 [id number] Ina Parmar MD   
   [facility] Adebayo Palomares. [place], [address] [place] [address] Phone: [phone] Fax: [phone] Visits Status Start Date End Date 1 New Request 11/2/17 11/2/18 If your referral has a status of pending review or denied, additional information will be sent to support the outcome of this decision. Patient Instructions 1. Referral to neurosurgery for left vastus lateralis muscle biopsy 2. Follow-up after the result of the muscle biopsy Introducing [facility] SERVICES! Dipti Granados introduces [employer] patient portal. Now you can access parts of your medical record, email your doctor's office, and request medication refills online. 1. In your internet browser, go to [email or web address]. [email or web address] 2. Click on the First Time User? Click Here link in the Sign In box. You will see the New Member Sign Up page. 3. Enter your [employer] Access Code exactly as it appears below. You will not need to use this code after youve completed the sign-up process. If you do not sign up before the expiration date, you must request a new code. · [employer] Access Code: [email or web address] Expires: 11/9/2017 12:41 PM 
 
4. Enter the last four digits of your Social Security Number (xxxx) and Date of Birth (mm/dd/yyyy) as indicated and click Submit. You will be taken to the next sign-up page. 5. Create a [employer] ID. This will be your [employer] login ID and cannot be changed, so think of one that is secure and easy to remember. 6. Create a [employer] password. You can change your password at any time. 7. Enter your Password Reset Question and Answer. This can be used at a later time if you forget your password. 8. Enter your e-mail address. You will receive e-mail notification when new information is available in [facility] [address] [facility] [address]. 9. Click Sign Up. You can now view and download portions of your medical record. 10. Click the Download Summary menu link to download a portable copy of your medical information. If you have questions, please visit the Frequently Asked Questions section of the [employer] website. Remember, [employer] is NOT to be used for urgent needs. For medical emergencies, dial 911. Now available from your iPhone and Android! Please provide this summary of care documentation to your next provider. Your primary care clinician is listed as FELY HILL [address] [place], [place] [address]e. If you have any questions after today's visit, please call [phone].

## 2017-11-02 NOTE — LETTER
11/2/2017 10:03 AM 
 
Patient: Varsha YOB: 1950 Date of Visit:    11/2/2017 Dear Edi Mitchell MD 
 
Thank you for referring Ms. Maddox to me for evaluation/treatment. Below are the relevant portions of my assessment and plan of care. Neurology Note REFERRED BY: 
Edi Mitchell MD 
 
11/02/17 Chief complaint: Lower extremity weakness SUBJECTIVE: 
 
Varsha is a 78 y.o. female who presented to the neurology office for management of progressive weakness in proximal muscles of the lower extremities. The patient states that she used to live on the third floor around 2014 but started noticing some weakness in 2015 and since 2016 it has gotten even more significant. It has been gradually progressing. She does not have any problems walking straight on a level floor but when she has to go up the stairs she starts having strain in her lower back and she has to pull herself up using the handrail. She notices weakness in her lower extremities as well. She denies any history of diabetes and there is no associated numbness in the lower extremities. She did have muscle enzymes and they were marginally elevated. Since last visit myasthenia antibodies were negative and the muscles enzymes were again elevated. Patient had EMG/nerve conduction study today and I did not find any evidence of a myopathy. Current Outpatient Prescriptions Medication Sig  escitalopram oxalate (LEXAPRO) 10 mg tablet Take 10 mg by mouth daily.  losartan (COZAAR) 100 mg tablet Take 100 mg by mouth daily.  valACYclovir (VALTREX) 1 gram tablet daily.  zolpidem (AMBIEN) 10 mg tablet Take 10 mg by mouth once.  estradiol (ESTRACE) 0.5 mg tablet Take  by mouth daily.  medroxyPROGESTERone (PROVERA) 2.5 mg tablet Take 2.5 mg by mouth daily.  cyanocobalamin, vitamin B-12, 3,000 mcg lozg by SubLINGual route.  cranberry extract 450 mg tab tablet Take 450 mg by mouth two (2) times a day. No current facility-administered medications for this visit. Allergies Allergen Reactions  Sulfa (Sulfonamide Antibiotics) Itching Past Medical History:  
Diagnosis Date  Herpes labialis  History of recurrent UTIs   
 sex related  Hormone replacement therapy  HTN (hypertension)  RBBB No past surgical history on file. Family History Problem Relation Age of Onset  Cancer Mother   
  colon Social History Substance Use Topics  Smoking status: Never Smoker  Smokeless tobacco: Never Used  Alcohol use Yes REVIEW OF SYSTEMS:  
A ten system review of constitutional, cardiovascular, respiratory, musculoskeletal, endocrine, skin, SHEENT, genitourinary, psychiatric and neurologic systems was obtained and is unremarkable with the exception of depression, joint pain, muscle pain, muscle weakness, neuropathy, snoring and vertigo EXAMINATION:  
Visit Vitals  /88 (BP 1 Location: Left arm, BP Patient Position: Sitting)  Pulse 80  SpO2 100% General:  
General appearance: Pt is in no acute distress Distal pulses are preserved Neurological Examination:  
Mental Status: AAO x3. Speech is fluent. Follows commands, has normal fund of knowledge, attention, short term recall, comprehension and insight. Cranial Nerves: Visual fields are full. PERRL, Extraocular movements are full. Facial sensation intact V1- V3. Facial movement intact, symmetric. Hearing intact to conversation. Palate elevates symmetrically. Shoulder shrug symmetric. Tongue midline. Motor: Strength is 5/5 in all 4 ext. No atrophy. Tone: Normal 
 
Sensation: Normal to light touch Coordination/Cerebellar: Intact to finger-nose-finger Skin: No significant bruising or lacerations. Laboratory review:  
Results for orders placed or performed in visit on 10/09/17 MYASTHENIA GRAVIS EVALUATION Result Value Ref Range AChR Binding Ab, serum <0.03 0.00 - 0.24 nmol/L Anti-striation Ab Negative Neg:<1:40 CK Result Value Ref Range Creatine Kinase,Total 329 (H) 24 - 173 U/L Imaging review: 
11/2/2017 EMG/nerve conduction study No evidence of myopathy 10/19/2017 MRI lumbar spine without contrast 
1. Small left foraminal extrusion extending cranially at L4-5 and additional 
left paracentral extrusion extending caudally at L4-5. This results in narrowing 
of the left subarticular zone at L4-5. Mild left foraminal narrowing, and 
narrowing of the left lateral recess posterior to L5 
2. Mild canal and bilateral subarticular zone stenosis L5-S1 
 
8/9/2017 Mammogram 
The left implant has new extracapsular silicone. The left implant shows extracapsular rupture. No specific mammographic evidence of malignancy. Documentation review: I reviewed the notes from the primary care physician from 8/11/2017. The patient does have hypertension. The patient is also complaining of lower extremity weakness. The patient has chronic UTIs. The patient is having trouble swallowing at times. We will do blood work and a 3D mammogram and a colonoscopy. We will start Keflex for recurrent UTI as needed. The patient is having pelvic girdle weakness and has problems going up the stairs. Will get a CK level. And will refer the patient to physical therapy. Assessment/Plan: Katharine Hassan is a 78 y.o. female who presented to the neurology office for management of weakness in the proximal muscles of the lower extremities. On examination I did not find any appreciable weakness in the proximal lower extremities. EMG/nerve conduction study was normal but the CK is persistently elevated. We will set her up to have a left vastus lateralis muscle biopsy. MRI of the lumbar spine has been normal as well. ICD-10-CM ICD-9-CM 1. Proximal limb muscle weakness M62.89 728.87 REFERRAL TO NEUROSURGERY 2. HyperCKemia R74.8 790.5 REFERRAL TO NEUROSURGERY Thank you for allowing me to participate in the care of Ms. Weiner. Please feel free to contact me if you have any questions. Sue Daniels MD 
Neurologist 
 
CC: Guevara Cadena MD 
Fax: [phone] This note was created using voice recognition software. Despite editing, there may be syntax errors. This note will not be viewable in [address] If you have questions, please do not hesitate to call me. I look forward to following Ms. Weiner along with you. Sincerely, Sue Daniels MD 
 
[facility] Neurology Clinic at [address] [place] [address] [address] [place] [address] Tel [phone]     Fax [phone] Electrodiagnostic Study Report Test Date:  2017 Patient: Tiffanie Santos  Age: 78 [facility]#: [id number]  Ref Phys: Katia Pena M.D. Sex: Female  Physician: Kayden Barclay MD  
Height: ' \"     
: 1950  DOS: 2017 CHIEF COMPLAINTS: 
Patient is a 78year-old female who presents with proximal lower extremity weakness. Query myopathy EMG & NCV Findings: 
 
Nerve Conduction Studies Anti Sensory Summary Table Site NR Peak (ms) Norm Peak (ms) P-T Amp (µV) Norm O-P Amp Site1 Site2 Dist (cm) Left Sup Fibular Anti Sensory (Lat ankle)  35°C Lower leg    2.8 <4.6 14.2 >4 Lower leg Lat ankle 10.0 Right Sup Fibular Anti Sensory (Lat ankle)  31°C Lower leg    2.9 <4.6 8.7 >4 Lower leg Lat ankle 10.0 Left Sural Anti Sensory (Lat Mall)  29.3°C Calf    4.1 <4.5 11.2 >4.0 Calf Lat Mall 14.0 Right Sural Anti Sensory (Lat Mall)  30.8°C Calf    3.5 <4.5 16.3 >4.0 Calf Lat Mall 14.0 Motor Summary Table Site NR Onset (ms) Norm Onset (ms) O-P Amp (mV) Norm O-P Amp P-T Amp (mV) Site1 Site2 Dist (cm) Leo (m/s) Left Fibular Motor (Ext Dig Brev)  29.2°C  
 Ankle    4.8 <6.5 6.0 >1.1 11.3 Ankle Ext Dig Brev 8.0 B Fib    10.9  5.6  10.3 B Fib Ankle 28.5 47 Poplt    13.1  5.4  10.0 Poplt B Fib 10.0 45 Right Fibular Motor (Ext Dig Brev)  33.3°C Ankle    4.0 <6.5 5.6 >1.1 10.3 Ankle Ext Dig Brev 8.0 B Fib    9.9  5.2  9.2 B Fib Ankle 28.5 48 Poplt    12.2  5.4  9.5 Poplt B Fib 10.0 43 Right Tibial Motor (Abd Baron Brev)  31.3°C Ankle    4.0 <6.1 19.6 >1.1 34.5 Ankle Abd Baron Brev 8.0 Knee    12.1  16.7  29.1 Knee Ankle 39.5 49 F Wave Studies NR F-Lat (ms) Lat Norm (ms) L-R F-Lat (ms) L-R Lat Norm Right Peroneal (Mrkrs) (EDB)  32.1°C  
   46.06 <56  <5.1 EMG Side Muscle Nerve Root Ins Act Fibs Psw Recrt Duration Amp Poly Comment Right AntTibialis Dp Br Peron L4-5 Nml Nml Nml Nml Nml Nml Nml Right MedGastroc Tibial S1-2 Nml Nml Nml Nml Nml Nml Nml Right VastusLat Femoral L2-4 Nml Nml Nml Nml Nml Nml Nml Right BicepsFemS Sciatic L5-S1 Nml Nml Nml Nml Nml Nml Nml Right Lower Lumb Parasp Rami L5,S1 Nml Nml Nml Nml Nml Nml Nml Right TensorFascLat SupGluteal L4-5, S1 Nml Nml Nml Nml Nml Nml Nml Waveforms: 
    
 
    
 
   
 
Summary: 
Nerve conduction studies of bilateral lower extremities were unremarkable. Needle electrode examination of the right lower extremity was unremarkable Impression: This is a normal study. There is no electrophysiological evidence of a myopathy. __________________ Nancy Edmondson M.D.

## 2017-11-02 NOTE — PROCEDURES
[facility] Neurology Clinic at [address] [facility] [address] [facility], [address] [facility]  Tel [phone]     Fax [phone]  Electrodiagnostic Study Report  Test Date:  2017    Patient: Sylvain Jolly  Age: 78   [facility]#: [id number]  Ref Phys: Connor Dent M.D. Sex: Female  Physician: Ashli Edouard MD   Height: ' \"      : 1950  DOS: 2017       CHIEF COMPLAINTS:  Patient is a 78year-old female who presents with proximal lower extremity weakness.   Query myopathy    EMG & NCV Findings:    Nerve Conduction Studies  Anti Sensory Summary Table     Site NR Peak (ms) Norm Peak (ms) P-T Amp (µV) Norm O-P Amp Site1 Site2 Dist (cm)   Left Sup Fibular Anti Sensory (Lat ankle)  35°C   Lower leg    2.8 <4.6 14.2 >4 Lower leg Lat ankle 10.0   Right Sup Fibular Anti Sensory (Lat ankle)  31°C   Lower leg    2.9 <4.6 8.7 >4 Lower leg Lat ankle 10.0   Left Sural Anti Sensory (Lat Mall)  29.3°C   Calf    4.1 <4.5 11.2 >4.0 Calf Lat Mall 14.0   Right Sural Anti Sensory (Lat Mall)  30.8°C   Calf    3.5 <4.5 16.3 >4.0 Calf Lat Mall 14.0     Motor Summary Table     Site NR Onset (ms) Norm Onset (ms) O-P Amp (mV) Norm O-P Amp P-T Amp (mV) Site1 Site2 Dist (cm) Leo (m/s)   Left Fibular Motor (Ext Dig Brev)  29.2°C   Ankle    4.8 <6.5 6.0 >1.1 11.3 Ankle Ext Dig Brev 8.0    B Fib    10.9  5.6  10.3 B Fib Ankle 28.5 47   Poplt    13.1  5.4  10.0 Poplt B Fib 10.0 45   Right Fibular Motor (Ext Dig Brev)  33.3°C   Ankle    4.0 <6.5 5.6 >1.1 10.3 Ankle Ext Dig Brev 8.0    B Fib    9.9  5.2  9.2 B Fib Ankle 28.5 48   Poplt    12.2  5.4  9.5 Poplt B Fib 10.0 43   Right Tibial Motor (Abd Baron Brev)  31.3°C   Ankle    4.0 <6.1 19.6 >1.1 34.5 Ankle Abd Baron Brev 8.0    Knee    12.1  16.7  29.1 Knee Ankle 39.5 49     F Wave Studies     NR F-Lat (ms) Lat Norm (ms) L-R F-Lat (ms) L-R Lat Norm   Right Peroneal (Mrkrs) (EDB)  32.1°C      46.06 <56  <5.1     EMG     Side Muscle Nerve Root Ins Act Fibs Psw Recrt Duration Amp Poly Comment   Right AntTibialis Dp Br Peron L4-5 Nml Nml Nml Nml Nml Nml Nml    Right MedGastroc Tibial S1-2 Nml Nml Nml Nml Nml Nml Nml    Right VastusLat Femoral L2-4 Nml Nml Nml Nml Nml Nml Nml    Right BicepsFemS Sciatic L5-S1 Nml Nml Nml Nml Nml Nml Nml    Right Lower Lumb Parasp Rami L5,S1 Nml Nml Nml Nml Nml Nml Nml    Right TensorFascLat SupGluteal L4-5, S1 Nml Nml Nml Nml Nml Nml Nml        Waveforms:                      Summary:  Nerve conduction studies of bilateral lower extremities were unremarkable. Needle electrode examination of the right lower extremity was unremarkable    Impression: This is a normal study. There is no electrophysiological evidence of a myopathy.               __________________  Kay Flor M.D.

## 2017-11-02 NOTE — PATIENT INSTRUCTIONS
1.  Referral to neurosurgery for left vastus lateralis muscle biopsy  2.   Follow-up after the result of the muscle biopsy

## 2017-11-02 NOTE — PROGRESS NOTES
Neurology Note  REFERRED BY:  Josiah Ovalle MD    11/02/17    Chief complaint: Lower extremity weakness    SUBJECTIVE:    Ivette Castro is a 78 y.o. female who presented to the neurology office for management of progressive weakness in proximal muscles of the lower extremities. The patient states that she used to live on the third floor around 2014 but started noticing some weakness in 2015 and since 2016 it has gotten even more significant. It has been gradually progressing. She does not have any problems walking straight on a level floor but when she has to go up the stairs she starts having strain in her lower back and she has to pull herself up using the handrail. She notices weakness in her lower extremities as well. She denies any history of diabetes and there is no associated numbness in the lower extremities. She did have muscle enzymes and they were marginally elevated. Since last visit myasthenia antibodies were negative and the muscles enzymes were again elevated. Patient had EMG/nerve conduction study today and I did not find any evidence of a myopathy. Current Outpatient Prescriptions   Medication Sig    escitalopram oxalate (LEXAPRO) 10 mg tablet Take 10 mg by mouth daily.  losartan (COZAAR) 100 mg tablet Take 100 mg by mouth daily.  valACYclovir (VALTREX) 1 gram tablet daily.  zolpidem (AMBIEN) 10 mg tablet Take 10 mg by mouth once.  estradiol (ESTRACE) 0.5 mg tablet Take  by mouth daily.  medroxyPROGESTERone (PROVERA) 2.5 mg tablet Take 2.5 mg by mouth daily.  cyanocobalamin, vitamin B-12, 3,000 mcg lozg by SubLINGual route.  cranberry extract 450 mg tab tablet Take 450 mg by mouth two (2) times a day. No current facility-administered medications for this visit.       Allergies   Allergen Reactions    Sulfa (Sulfonamide Antibiotics) Itching     Past Medical History:   Diagnosis Date    Herpes labialis     History of recurrent UTIs     sex related    Hormone replacement therapy     HTN (hypertension)     RBBB      No past surgical history on file. Family History   Problem Relation Age of Onset    Cancer Mother      colon     Social History   Substance Use Topics    Smoking status: Never Smoker    Smokeless tobacco: Never Used    Alcohol use Yes       REVIEW OF SYSTEMS:   A ten system review of constitutional, cardiovascular, respiratory, musculoskeletal, endocrine, skin, SHEENT, genitourinary, psychiatric and neurologic systems was obtained and is unremarkable with the exception of depression, joint pain, muscle pain, muscle weakness, neuropathy, snoring and vertigo     EXAMINATION:   Visit Vitals    /88 (BP 1 Location: Left arm, BP Patient Position: Sitting)    Pulse 80    SpO2 100%        General:   General appearance: Pt is in no acute distress   Distal pulses are preserved    Neurological Examination:   Mental Status: AAO x3. Speech is fluent. Follows commands, has normal fund of knowledge, attention, short term recall, comprehension and insight. Cranial Nerves: Visual fields are full. PERRL, Extraocular movements are full. Facial sensation intact V1- V3. Facial movement intact, symmetric. Hearing intact to conversation. Palate elevates symmetrically. Shoulder shrug symmetric. Tongue midline. Motor: Strength is 5/5 in all 4 ext. No atrophy. Tone: Normal    Sensation: Normal to light touch    Coordination/Cerebellar: Intact to finger-nose-finger     Skin: No significant bruising or lacerations. Laboratory review:   Results for orders placed or performed in visit on 10/09/17   MYASTHENIA GRAVIS EVALUATION   Result Value Ref Range    AChR Binding Ab, serum <0.03 0.00 - 0.24 nmol/L    Anti-striation Ab Negative Neg:<1:40   CK   Result Value Ref Range    Creatine Kinase,Total 329 (H) 24 - 173 U/L       Imaging review:  11/2/2017  EMG/nerve conduction study  No evidence of myopathy    10/19/2017  MRI lumbar spine without contrast  1. Small left foraminal extrusion extending cranially at L4-5 and additional  left paracentral extrusion extending caudally at L4-5. This results in narrowing  of the left subarticular zone at L4-5. Mild left foraminal narrowing, and  narrowing of the left lateral recess posterior to L5  2. Mild canal and bilateral subarticular zone stenosis L5-S1    8/9/2017  Mammogram  The left implant has new extracapsular silicone. The left implant shows extracapsular rupture. No specific mammographic evidence of malignancy. Documentation review:  I reviewed the notes from the primary care physician from 8/11/2017. The patient does have hypertension. The patient is also complaining of lower extremity weakness. The patient has chronic UTIs. The patient is having trouble swallowing at times. We will do blood work and a 3D mammogram and a colonoscopy. We will start Keflex for recurrent UTI as needed. The patient is having pelvic girdle weakness and has problems going up the stairs. Will get a CK level. And will refer the patient to physical therapy. Assessment/Plan: Jose Walker is a 78 y.o. female who presented to the neurology office for management of weakness in the proximal muscles of the lower extremities. On examination I did not find any appreciable weakness in the proximal lower extremities. EMG/nerve conduction study was normal but the CK is persistently elevated. We will set her up to have a left vastus lateralis muscle biopsy. MRI of the lumbar spine has been normal as well. ICD-10-CM ICD-9-CM    1. Proximal limb muscle weakness M62.89 728.87 REFERRAL TO NEUROSURGERY   2. HyperCKemia R74.8 790.5 REFERRAL TO NEUROSURGERY      Thank you for allowing me to participate in the care of Ms. Weiner. Please feel free to contact me if you have any questions. Zeus Santos MD  Neurologist    CC: Senait Canales MD  Fax: [phone]    This note was created using voice recognition software. Despite editing, there may be syntax errors. This note will not be viewable in [address]

## 2017-11-07 ENCOUNTER — DOCUMENTATION ONLY (OUTPATIENT)
Dept: NEUROLOGY | Age: 67
End: 2017-11-07

## 2017-11-07 NOTE — PROGRESS NOTES
Faxed on 11/3/2017 to Dr. Rebecca Rico office Referral ,Progress Notes, EMG Report, Insurance Card Image, Patient Registration.

## 2018-01-16 ENCOUNTER — HOSPITAL ENCOUNTER (OUTPATIENT)
Dept: PREADMISSION TESTING | Age: 68
Discharge: HOME | End: 2018-01-16
Payer: MEDICARE

## 2018-01-16 VITALS
WEIGHT: 147.49 LBS | RESPIRATION RATE: 18 BRPM | BODY MASS INDEX: 24.57 KG/M2 | OXYGEN SATURATION: 97 % | HEIGHT: 65 IN | TEMPERATURE: 97.9 F | HEART RATE: 94 BPM | SYSTOLIC BLOOD PRESSURE: 158 MMHG | DIASTOLIC BLOOD PRESSURE: 60 MMHG

## 2018-01-16 LAB
ABO + RH BLD: NORMAL
ALBUMIN SERPL-MCNC: 3.6 G/DL (ref 3.5–5)
ALBUMIN/GLOB SERPL: 1 {RATIO} (ref 1.1–2.2)
ALP SERPL-CCNC: 39 U/L (ref 45–117)
ALT SERPL-CCNC: 24 U/L (ref 12–78)
ANION GAP SERPL CALC-SCNC: 6 MMOL/L (ref 5–15)
APPEARANCE UR: ABNORMAL
APTT PPP: 27.3 SEC (ref 22.1–32.5)
AST SERPL-CCNC: 20 U/L (ref 15–37)
BACTERIA URNS QL MICRO: NEGATIVE /HPF
BASOPHILS # BLD: 0 K/UL (ref 0–0.1)
BASOPHILS NFR BLD: 0 % (ref 0–1)
BILIRUB SERPL-MCNC: 0.5 MG/DL (ref 0.2–1)
BILIRUB UR QL: NEGATIVE
BLOOD GROUP ANTIBODIES SERPL: NORMAL
BUN SERPL-MCNC: 23 MG/DL (ref 6–20)
BUN/CREAT SERPL: 24 (ref 12–20)
CALCIUM SERPL-MCNC: 8.7 MG/DL (ref 8.5–10.1)
CHLORIDE SERPL-SCNC: 106 MMOL/L (ref 97–108)
CO2 SERPL-SCNC: 28 MMOL/L (ref 21–32)
COLOR UR: ABNORMAL
CREAT SERPL-MCNC: 0.95 MG/DL (ref 0.55–1.02)
EOSINOPHIL # BLD: 0 K/UL (ref 0–0.4)
EOSINOPHIL NFR BLD: 1 % (ref 0–7)
EPITH CASTS URNS QL MICRO: ABNORMAL /LPF
ERYTHROCYTE [DISTWIDTH] IN BLOOD BY AUTOMATED COUNT: 12.8 % (ref 11.5–14.5)
EST. AVERAGE GLUCOSE BLD GHB EST-MCNC: NORMAL MG/DL
GLOBULIN SER CALC-MCNC: 3.6 G/DL (ref 2–4)
GLUCOSE SERPL-MCNC: 94 MG/DL (ref 65–100)
GLUCOSE UR STRIP.AUTO-MCNC: NEGATIVE MG/DL
HBA1C MFR BLD: 4.8 % (ref 4.2–6.3)
HCT VFR BLD AUTO: 36.4 % (ref 35–47)
HGB BLD-MCNC: 12.2 G/DL (ref 11.5–16)
HGB UR QL STRIP: ABNORMAL
HYALINE CASTS URNS QL MICRO: ABNORMAL /LPF (ref 0–5)
INR PPP: 1 (ref 0.9–1.1)
KETONES UR QL STRIP.AUTO: NEGATIVE MG/DL
LEUKOCYTE ESTERASE UR QL STRIP.AUTO: ABNORMAL
LYMPHOCYTES # BLD: 1.1 K/UL (ref 0.8–3.5)
LYMPHOCYTES NFR BLD: 20 % (ref 12–49)
MCH RBC QN AUTO: 33 PG (ref 26–34)
MCHC RBC AUTO-ENTMCNC: 33.5 G/DL (ref 30–36.5)
MCV RBC AUTO: 98.4 FL (ref 80–99)
MONOCYTES # BLD: 0.5 K/UL (ref 0–1)
MONOCYTES NFR BLD: 10 % (ref 5–13)
NEUTS SEG # BLD: 3.6 K/UL (ref 1.8–8)
NEUTS SEG NFR BLD: 69 % (ref 32–75)
NITRITE UR QL STRIP.AUTO: NEGATIVE
PH UR STRIP: 5.5 [PH] (ref 5–8)
PLATELET # BLD AUTO: 259 K/UL (ref 150–400)
POTASSIUM SERPL-SCNC: 4 MMOL/L (ref 3.5–5.1)
PROT SERPL-MCNC: 7.2 G/DL (ref 6.4–8.2)
PROT UR STRIP-MCNC: 30 MG/DL
PROTHROMBIN TIME: 10 SEC (ref 9–11.1)
RBC # BLD AUTO: 3.7 M/UL (ref 3.8–5.2)
RBC #/AREA URNS HPF: ABNORMAL /HPF (ref 0–5)
SODIUM SERPL-SCNC: 140 MMOL/L (ref 136–145)
SP GR UR REFRACTOMETRY: 1.02 (ref 1–1.03)
SPECIMEN EXP DATE BLD: NORMAL
THERAPEUTIC RANGE,PTTT: NORMAL SECS (ref 58–77)
UA: UC IF INDICATED,UAUC: ABNORMAL
UROBILINOGEN UR QL STRIP.AUTO: 1 EU/DL (ref 0.2–1)
WBC # BLD AUTO: 5.2 K/UL (ref 3.6–11)
WBC URNS QL MICRO: >100 /HPF (ref 0–4)

## 2018-01-16 PROCEDURE — 81001 URINALYSIS AUTO W/SCOPE: CPT | Performed by: NEUROLOGICAL SURGERY

## 2018-01-16 PROCEDURE — 85025 COMPLETE CBC W/AUTO DIFF WBC: CPT | Performed by: NEUROLOGICAL SURGERY

## 2018-01-16 PROCEDURE — 85610 PROTHROMBIN TIME: CPT | Performed by: NEUROLOGICAL SURGERY

## 2018-01-16 PROCEDURE — 83036 HEMOGLOBIN GLYCOSYLATED A1C: CPT | Performed by: NEUROLOGICAL SURGERY

## 2018-01-16 PROCEDURE — 36415 COLL VENOUS BLD VENIPUNCTURE: CPT | Performed by: NEUROLOGICAL SURGERY

## 2018-01-16 PROCEDURE — 86850 RBC ANTIBODY SCREEN: CPT | Performed by: NEUROLOGICAL SURGERY

## 2018-01-16 PROCEDURE — 87086 URINE CULTURE/COLONY COUNT: CPT | Performed by: NEUROLOGICAL SURGERY

## 2018-01-16 PROCEDURE — 85730 THROMBOPLASTIN TIME PARTIAL: CPT | Performed by: NEUROLOGICAL SURGERY

## 2018-01-16 PROCEDURE — 80053 COMPREHEN METABOLIC PANEL: CPT | Performed by: NEUROLOGICAL SURGERY

## 2018-01-16 RX ORDER — NITROFURANTOIN (MACROCRYSTALS) 100 MG/1
100 CAPSULE ORAL 2 TIMES DAILY
Status: ON HOLD | COMMUNITY
Start: 2018-01-18 | End: 2018-01-24 | Stop reason: SDUPTHER

## 2018-01-16 RX ORDER — OMEPRAZOLE 20 MG/1
20 CAPSULE, DELAYED RELEASE ORAL
COMMUNITY

## 2018-01-16 RX ORDER — TRIAMCINOLONE ACETONIDE 1 MG/G
CREAM TOPICAL 2 TIMES DAILY
COMMUNITY

## 2018-01-16 RX ORDER — CEFAZOLIN SODIUM/WATER 2 G/20 ML
2 SYRINGE (ML) INTRAVENOUS ONCE
Status: CANCELLED | OUTPATIENT
Start: 2018-01-24 | End: 2018-01-24

## 2018-01-16 RX ORDER — SODIUM CHLORIDE, SODIUM LACTATE, POTASSIUM CHLORIDE, CALCIUM CHLORIDE 600; 310; 30; 20 MG/100ML; MG/100ML; MG/100ML; MG/100ML
25 INJECTION, SOLUTION INTRAVENOUS CONTINUOUS
Status: CANCELLED | OUTPATIENT
Start: 2018-01-24

## 2018-01-16 NOTE — PROGRESS NOTES
[address] [address] (MOB IV), [address] [place]  [place], [address]  Phone: [phone] Fax: [phone]    Discharge Summary 2-15    Patient name: Harley Fong  : 1950  Provider#: [id number]  Referral source: Rene Maki MD      Medical/Treatment Diagnosis: Low back pain [M54.5]     Prior Hospitalization: see medical history     Comorbidities: See Plan of Care  Prior Level of Function: See Plan of Care  Medications: Verified on Patient Summary List    Start of Care: 10/27/17      Onset Date:chronic   Visits from Start of Care: 1     Missed Visits: 0  Reporting Period : 10/27/17 to 10/27/17    Assessment/Summary of care: Pt is discharged today, 2018, as they have stopped attending therapy. Final objective data and outcomes were unable to be obtained. G-Codes (GP)  G-codes were not obtained as patient did not return for final reassessment.     RECOMMENDATIONS:  [x]Discontinue therapy: []Patient has reached or is progressing toward set goals     [x]Patient is non-compliant or has abdicated     []Due to lack of appreciable progress towards set goals     []Other  Monica Wong PT 2018 8:11 AM

## 2018-01-16 NOTE — PERIOP NOTES
[facility]  Preoperative Instructions        Surgery Date 1/24/2018          Time of Arrival 6:15 a.m.    1. On the day of your surgery, please report to the Surgical Services Registration Desk and sign in at your designated time. The Surgery Center is located to the right of the Emergency Room. 2. You must have someone with you to drive you home. You should not drive a car for 24 hours following surgery. Please make arrangements for a friend or family member to stay with you for the first 24 hours after your surgery. 3. Do not have anything to eat or drink (including water, gum, mints, coffee, juice) after midnight. ?This may not apply to medications prescribed by your physician. ?(Please note below the special instructions with medications to take the morning of your procedure.)    4. We recommend you do not drink any alcoholic beverages for 24 hours before and after your surgery. 5. Contact your surgeons office for instructions on the following medications: non-steroidal anti-inflammatory drugs (i.e. Advil, Aleve), vitamins, and supplements. (Some surgeons will want you to stop these medications prior to surgery and others may allow you to take them)  **If you are currently taking Plavix, Coumadin, Aspirin and/or other blood-thinning agents, contact your surgeon for instructions. ** Your surgeon will partner with the physician prescribing these medications to determine if it is safe to stop or if you need to continue taking. Please do not stop taking these medications without instructions from your surgeon    6. Wear comfortable clothes. Wear glasses instead of contacts. Do not bring any money or jewelry. Please bring picture ID, insurance card, and any prearranged co-payment or hospital payment. Do not wear make-up, particularly mascara the morning of your surgery. Do not wear nail polish, particularly if you are having foot /hand surgery.   Wear your hair loose or down, no ponytails, buns, ana laura pins or clips. All body piercings must be removed. Please shower with antibacterial soap for three consecutive days before and on the morning of surgery, but do not apply any lotions, powders or deodorants after the shower on the day of surgery. Please use a fresh towels after each shower. Please sleep in clean clothes and change bed linens the night before surgery. Please do not shave for 48 hours prior to surgery. Shaving of the face is acceptable. 7. You should understand that if you do not follow these instructions your surgery may be cancelled. If your physical condition changes (I.e. fever, cold or flu) please contact your surgeon as soon as possible. 8. It is important that you be on time. If a situation occurs where you may be late, please call [phone] (OR Holding Area). 9. If you have any questions and or problems, please call [phone] (Pre-admission Testing). 10. Your surgery time may be subject to change. You will receive a phone call the evening prior if your time changes. 11.  If having outpatient surgery, you must have someone to drive you here, stay with you during the duration of your stay, and to drive you home at time of discharge. 12.   In an effort to improve the efficiency, privacy, and safety for all of our Pre-op patients visitors are not allowed in the Holding area. Once you arrive and are registered your family/visitors will be asked to remain in the waiting room. The Pre-op staff will get you from the Surgical Waiting Area and will explain to you and your family/visitors that the Pre-op phase is beginning. The staff will answer any questions and provide instructions for tracking of the patient, by use of the existing tracking number and color-coded status board in the waiting room.   At this time the staff will also ask for your designated spokesperson information in the event that the physician or staff need to provide an update or obtain any pertinent information. The designated spokesperson will be notified if the physician needs to speak to family during the pre-operative phase. If at any time your family/visitors has questions or concerns they may approach the volunteer desk in the waiting area for assistance. Special Instructions:    MEDICATIONS TO TAKE THE MORNING OF SURGERY WITH A SIP OF WATER: medroxyprogesterone, estradiol, escitalopram      I understand a pre-operative phone call will be made to verify my surgery time. In the event that I am not available, I give permission for a message to be left on my answering service and/or with another person?   Yes [phone]           ___________________      __________   _________    (Signature of Patient)             (Witness)                (Date and Time)

## 2018-01-16 NOTE — PERIOP NOTES
Incentive Spirometer        Using the incentive spirometer helps expand the small air sacs of your lungs, helps you breathe deeply, and helps improve your lung function. Use your incentive spirometer twice a day (10 breaths each time) prior to surgery. How to Use Your Incentive Spirometer:  1. Hold the incentive spirometer in an upright position. 2. Breathe out as usual.   3. Place the mouthpiece in your mouth and seal your lips tightly around it. 4. Take a deep breath. Breathe in slowly and as deeply as possible. Keep the blue flow rate guide between the arrows. 5. Hold your breath as long as possible. Then exhale slowly and allow the piston to fall to the bottom of the column. 6. Rest for a few seconds and repeat steps one through five at least 10 times. PAT Tidal Volume__________________  x________________  Date_______________________    Margaux Miles THE INCENTIVE SPIROMETER WITH YOU TO THE HOSPITAL ON THE DAY OF YOUR SURGERY. Opportunity given to ask and answer questions as well as to observe return demonstration.     Patient signature_____________________________    Witness____________________________

## 2018-01-17 LAB
BACTERIA SPEC CULT: NORMAL
BACTERIA SPEC CULT: NORMAL
SERVICE CMNT-IMP: NORMAL

## 2018-01-18 LAB
BACTERIA SPEC CULT: ABNORMAL
CC UR VC: ABNORMAL
SERVICE CMNT-IMP: ABNORMAL

## 2018-01-18 RX ORDER — NITROFURANTOIN 25; 75 MG/1; MG/1
100 CAPSULE ORAL 2 TIMES DAILY
Qty: 14 CAP | Refills: 0 | Status: SHIPPED | OUTPATIENT
Start: 2018-01-18 | End: 2018-01-25

## 2018-01-18 NOTE — ADVANCED PRACTICE NURSE
PC to pt regarding urine cx results and need to increase Macrobid to BID x 7 days. Rx escribed to [facility] on [address] per pt request.  Pt denies fever, chills, myalgias, flank pain. Has a hx of chronic UTIs for which she has been evaluated by urology and has daily Macrobid but states she missed 2-3 days of medication prior to PAT visit.

## 2018-01-24 ENCOUNTER — APPOINTMENT (OUTPATIENT)
Dept: GENERAL RADIOLOGY | Age: 68
End: 2018-01-24
Attending: NEUROLOGICAL SURGERY
Payer: MEDICARE

## 2018-01-24 ENCOUNTER — ANESTHESIA EVENT (OUTPATIENT)
Dept: SURGERY | Age: 68
End: 2018-01-24
Payer: MEDICARE

## 2018-01-24 ENCOUNTER — ANESTHESIA (OUTPATIENT)
Dept: SURGERY | Age: 68
End: 2018-01-24
Payer: MEDICARE

## 2018-01-24 ENCOUNTER — HOSPITAL ENCOUNTER (OUTPATIENT)
Age: 68
Setting detail: OBSERVATION
Discharge: HOME | End: 2018-01-25
Attending: NEUROLOGICAL SURGERY | Admitting: NEUROLOGICAL SURGERY
Payer: MEDICARE

## 2018-01-24 DIAGNOSIS — G47.00 INSOMNIA, UNSPECIFIED TYPE: ICD-10-CM

## 2018-01-24 DIAGNOSIS — Z98.890 STATUS POST LUMBAR SPINE SURGERY FOR DECOMPRESSION OF SPINAL CORD: Primary | ICD-10-CM

## 2018-01-24 PROBLEM — M51.26 HERNIATION OF LUMBAR INTERVERTEBRAL DISC: Status: ACTIVE | Noted: 2018-01-24

## 2018-01-24 PROCEDURE — 77030008684 HC TU ET CUF COVD -B: Performed by: NURSE ANESTHETIST, CERTIFIED REGISTERED

## 2018-01-24 PROCEDURE — 74011250637 HC RX REV CODE- 250/637: Performed by: NURSE PRACTITIONER

## 2018-01-24 PROCEDURE — 74011000272 HC RX REV CODE- 272: Performed by: NEUROLOGICAL SURGERY

## 2018-01-24 PROCEDURE — 88342 IMHCHEM/IMCYTCHM 1ST ANTB: CPT | Performed by: NEUROLOGICAL SURGERY

## 2018-01-24 PROCEDURE — 74011250636 HC RX REV CODE- 250/636: Performed by: NEUROLOGICAL SURGERY

## 2018-01-24 PROCEDURE — 76210000006 HC OR PH I REC 0.5 TO 1 HR: Performed by: NEUROLOGICAL SURGERY

## 2018-01-24 PROCEDURE — 97161 PT EVAL LOW COMPLEX 20 MIN: CPT

## 2018-01-24 PROCEDURE — 77030026438 HC STYL ET INTUB CARD -A: Performed by: NURSE ANESTHETIST, CERTIFIED REGISTERED

## 2018-01-24 PROCEDURE — 77030002987 HC SUT PROL J&J -B: Performed by: NEUROLOGICAL SURGERY

## 2018-01-24 PROCEDURE — 88348 ELECTRON MICROSCOPY DX: CPT | Performed by: NEUROLOGICAL SURGERY

## 2018-01-24 PROCEDURE — 76060000033 HC ANESTHESIA 1 TO 1.5 HR: Performed by: NEUROLOGICAL SURGERY

## 2018-01-24 PROCEDURE — 74011250636 HC RX REV CODE- 250/636: Performed by: ANESTHESIOLOGY

## 2018-01-24 PROCEDURE — 76001 XR FLUOROSCOPY OVER 60 MINUTES: CPT

## 2018-01-24 PROCEDURE — 77030013079 HC BLNKT BAIR HGGR 3M -A: Performed by: NURSE ANESTHETIST, CERTIFIED REGISTERED

## 2018-01-24 PROCEDURE — 74011250636 HC RX REV CODE- 250/636

## 2018-01-24 PROCEDURE — 77010033678 HC OXYGEN DAILY

## 2018-01-24 PROCEDURE — 74011250637 HC RX REV CODE- 250/637

## 2018-01-24 PROCEDURE — 88319 ENZYME HISTOCHEMISTRY: CPT | Performed by: NEUROLOGICAL SURGERY

## 2018-01-24 PROCEDURE — 97530 THERAPEUTIC ACTIVITIES: CPT

## 2018-01-24 PROCEDURE — 77030002933 HC SUT MCRYL J&J -A: Performed by: NEUROLOGICAL SURGERY

## 2018-01-24 PROCEDURE — 99218 HC RM OBSERVATION: CPT

## 2018-01-24 PROCEDURE — 77030029099 HC BN WAX SSPC -A: Performed by: NEUROLOGICAL SURGERY

## 2018-01-24 PROCEDURE — G8978 MOBILITY CURRENT STATUS: HCPCS

## 2018-01-24 PROCEDURE — 74011000250 HC RX REV CODE- 250

## 2018-01-24 PROCEDURE — 88313 SPECIAL STAINS GROUP 2: CPT | Performed by: NEUROLOGICAL SURGERY

## 2018-01-24 PROCEDURE — 77030003029 HC SUT VCRL J&J -B: Performed by: NEUROLOGICAL SURGERY

## 2018-01-24 PROCEDURE — 77030003028 HC SUT VCRL J&J -A: Performed by: NEUROLOGICAL SURGERY

## 2018-01-24 PROCEDURE — 77030013474 HC CRD BPLR DISP ADLR -A: Performed by: NEUROLOGICAL SURGERY

## 2018-01-24 PROCEDURE — 74011250637 HC RX REV CODE- 250/637: Performed by: NEUROLOGICAL SURGERY

## 2018-01-24 PROCEDURE — 97116 GAIT TRAINING THERAPY: CPT

## 2018-01-24 PROCEDURE — 77030014650 HC SEAL MTRX FLOSEL BAXT -C: Performed by: NEUROLOGICAL SURGERY

## 2018-01-24 PROCEDURE — 74011000250 HC RX REV CODE- 250: Performed by: NEUROLOGICAL SURGERY

## 2018-01-24 PROCEDURE — G8979 MOBILITY GOAL STATUS: HCPCS

## 2018-01-24 PROCEDURE — 88314 HISTOCHEMICAL STAINS ADD-ON: CPT | Performed by: NEUROLOGICAL SURGERY

## 2018-01-24 PROCEDURE — 77030018836 HC SOL IRR NACL ICUM -A: Performed by: NEUROLOGICAL SURGERY

## 2018-01-24 PROCEDURE — 88341 IMHCHEM/IMCYTCHM EA ADD ANTB: CPT | Performed by: NEUROLOGICAL SURGERY

## 2018-01-24 PROCEDURE — 72020 X-RAY EXAM OF SPINE 1 VIEW: CPT

## 2018-01-24 PROCEDURE — 76010000161 HC OR TIME 1 TO 1.5 HR INTENSV-TIER 1: Performed by: NEUROLOGICAL SURGERY

## 2018-01-24 RX ORDER — HYDROMORPHONE HYDROCHLORIDE 2 MG/ML
INJECTION, SOLUTION INTRAMUSCULAR; INTRAVENOUS; SUBCUTANEOUS AS NEEDED
Status: DISCONTINUED | OUTPATIENT
Start: 2018-01-24 | End: 2018-01-24 | Stop reason: HOSPADM

## 2018-01-24 RX ORDER — ACETAMINOPHEN 10 MG/ML
INJECTION, SOLUTION INTRAVENOUS AS NEEDED
Status: DISCONTINUED | OUTPATIENT
Start: 2018-01-24 | End: 2018-01-24 | Stop reason: HOSPADM

## 2018-01-24 RX ORDER — OXYCODONE HYDROCHLORIDE 5 MG/1
5 TABLET ORAL
Status: COMPLETED | OUTPATIENT
Start: 2018-01-24 | End: 2018-01-24

## 2018-01-24 RX ORDER — LANOLIN ALCOHOL/MO/W.PET/CERES
9 CREAM (GRAM) TOPICAL
Status: DISCONTINUED | OUTPATIENT
Start: 2018-01-24 | End: 2018-01-25 | Stop reason: HOSPADM

## 2018-01-24 RX ORDER — NITROFURANTOIN 25; 75 MG/1; MG/1
100 CAPSULE ORAL 2 TIMES DAILY
Status: DISCONTINUED | OUTPATIENT
Start: 2018-01-24 | End: 2018-01-25 | Stop reason: HOSPADM

## 2018-01-24 RX ORDER — SODIUM CHLORIDE, SODIUM LACTATE, POTASSIUM CHLORIDE, CALCIUM CHLORIDE 600; 310; 30; 20 MG/100ML; MG/100ML; MG/100ML; MG/100ML
25 INJECTION, SOLUTION INTRAVENOUS CONTINUOUS
Status: DISCONTINUED | OUTPATIENT
Start: 2018-01-24 | End: 2018-01-24 | Stop reason: HOSPADM

## 2018-01-24 RX ORDER — GLYCOPYRROLATE 0.2 MG/ML
INJECTION INTRAMUSCULAR; INTRAVENOUS AS NEEDED
Status: DISCONTINUED | OUTPATIENT
Start: 2018-01-24 | End: 2018-01-24 | Stop reason: HOSPADM

## 2018-01-24 RX ORDER — PHENYLEPHRINE HCL IN 0.9% NACL 0.4MG/10ML
SYRINGE (ML) INTRAVENOUS AS NEEDED
Status: DISCONTINUED | OUTPATIENT
Start: 2018-01-24 | End: 2018-01-24 | Stop reason: HOSPADM

## 2018-01-24 RX ORDER — BUPIVACAINE HYDROCHLORIDE AND EPINEPHRINE 5; 5 MG/ML; UG/ML
30 INJECTION, SOLUTION EPIDURAL; INTRACAUDAL; PERINEURAL ONCE
Status: COMPLETED | OUTPATIENT
Start: 2018-01-24 | End: 2018-01-24

## 2018-01-24 RX ORDER — CEFAZOLIN SODIUM/WATER 2 G/20 ML
2 SYRINGE (ML) INTRAVENOUS EVERY 8 HOURS
Status: COMPLETED | OUTPATIENT
Start: 2018-01-24 | End: 2018-01-25

## 2018-01-24 RX ORDER — VALSARTAN 160 MG/1
160 TABLET ORAL DAILY
Status: DISCONTINUED | OUTPATIENT
Start: 2018-01-25 | End: 2018-01-25 | Stop reason: HOSPADM

## 2018-01-24 RX ORDER — ESTRADIOL 1 MG/1
0.5 TABLET ORAL DAILY
Status: DISCONTINUED | OUTPATIENT
Start: 2018-01-24 | End: 2018-01-25 | Stop reason: HOSPADM

## 2018-01-24 RX ORDER — CEFAZOLIN SODIUM/WATER 2 G/20 ML
2 SYRINGE (ML) INTRAVENOUS ONCE
Status: COMPLETED | OUTPATIENT
Start: 2018-01-24 | End: 2018-01-24

## 2018-01-24 RX ORDER — SODIUM CHLORIDE 0.9 % (FLUSH) 0.9 %
5-10 SYRINGE (ML) INJECTION EVERY 8 HOURS
Status: DISCONTINUED | OUTPATIENT
Start: 2018-01-25 | End: 2018-01-25 | Stop reason: HOSPADM

## 2018-01-24 RX ORDER — PROPOFOL 10 MG/ML
INJECTION, EMULSION INTRAVENOUS AS NEEDED
Status: DISCONTINUED | OUTPATIENT
Start: 2018-01-24 | End: 2018-01-24 | Stop reason: HOSPADM

## 2018-01-24 RX ORDER — PANTOPRAZOLE SODIUM 40 MG/1
40 TABLET, DELAYED RELEASE ORAL
Status: DISCONTINUED | OUTPATIENT
Start: 2018-01-25 | End: 2018-01-25 | Stop reason: HOSPADM

## 2018-01-24 RX ORDER — ONDANSETRON 2 MG/ML
4 INJECTION INTRAMUSCULAR; INTRAVENOUS
Status: ACTIVE | OUTPATIENT
Start: 2018-01-24 | End: 2018-01-25

## 2018-01-24 RX ORDER — NALOXONE HYDROCHLORIDE 0.4 MG/ML
0.4 INJECTION, SOLUTION INTRAMUSCULAR; INTRAVENOUS; SUBCUTANEOUS AS NEEDED
Status: DISCONTINUED | OUTPATIENT
Start: 2018-01-24 | End: 2018-01-25 | Stop reason: HOSPADM

## 2018-01-24 RX ORDER — SODIUM CHLORIDE 0.9 % (FLUSH) 0.9 %
5-10 SYRINGE (ML) INJECTION AS NEEDED
Status: DISCONTINUED | OUTPATIENT
Start: 2018-01-24 | End: 2018-01-24 | Stop reason: HOSPADM

## 2018-01-24 RX ORDER — DIPHENHYDRAMINE HCL 25 MG
25 CAPSULE ORAL
Status: DISCONTINUED | OUTPATIENT
Start: 2018-01-24 | End: 2018-01-25 | Stop reason: HOSPADM

## 2018-01-24 RX ORDER — DEXAMETHASONE SODIUM PHOSPHATE 4 MG/ML
INJECTION, SOLUTION INTRA-ARTICULAR; INTRALESIONAL; INTRAMUSCULAR; INTRAVENOUS; SOFT TISSUE AS NEEDED
Status: DISCONTINUED | OUTPATIENT
Start: 2018-01-24 | End: 2018-01-24 | Stop reason: HOSPADM

## 2018-01-24 RX ORDER — OXYCODONE HYDROCHLORIDE 5 MG/1
TABLET ORAL
Status: COMPLETED
Start: 2018-01-24 | End: 2018-01-24

## 2018-01-24 RX ORDER — PANTOPRAZOLE SODIUM 40 MG/1
40 TABLET, DELAYED RELEASE ORAL DAILY PRN
Status: DISCONTINUED | OUTPATIENT
Start: 2018-01-24 | End: 2018-01-24

## 2018-01-24 RX ORDER — CYCLOBENZAPRINE HCL 10 MG
5 TABLET ORAL
Status: DISCONTINUED | OUTPATIENT
Start: 2018-01-24 | End: 2018-01-25 | Stop reason: HOSPADM

## 2018-01-24 RX ORDER — NITROFURANTOIN 25; 75 MG/1; MG/1
100 CAPSULE ORAL 2 TIMES DAILY
Status: DISCONTINUED | OUTPATIENT
Start: 2018-01-24 | End: 2018-01-24

## 2018-01-24 RX ORDER — HYDROMORPHONE HYDROCHLORIDE 1 MG/ML
0.2 INJECTION, SOLUTION INTRAMUSCULAR; INTRAVENOUS; SUBCUTANEOUS
Status: DISCONTINUED | OUTPATIENT
Start: 2018-01-24 | End: 2018-01-24 | Stop reason: HOSPADM

## 2018-01-24 RX ORDER — ESCITALOPRAM OXALATE 10 MG/1
10 TABLET ORAL DAILY
Status: DISCONTINUED | OUTPATIENT
Start: 2018-01-24 | End: 2018-01-25 | Stop reason: HOSPADM

## 2018-01-24 RX ORDER — ONDANSETRON 2 MG/ML
INJECTION INTRAMUSCULAR; INTRAVENOUS AS NEEDED
Status: DISCONTINUED | OUTPATIENT
Start: 2018-01-24 | End: 2018-01-24 | Stop reason: HOSPADM

## 2018-01-24 RX ORDER — SUCCINYLCHOLINE CHLORIDE 20 MG/ML
INJECTION INTRAMUSCULAR; INTRAVENOUS AS NEEDED
Status: DISCONTINUED | OUTPATIENT
Start: 2018-01-24 | End: 2018-01-24 | Stop reason: HOSPADM

## 2018-01-24 RX ORDER — SODIUM CHLORIDE 9 MG/ML
125 INJECTION, SOLUTION INTRAVENOUS CONTINUOUS
Status: DISPENSED | OUTPATIENT
Start: 2018-01-24 | End: 2018-01-25

## 2018-01-24 RX ORDER — SODIUM CHLORIDE 0.9 % (FLUSH) 0.9 %
5-10 SYRINGE (ML) INJECTION AS NEEDED
Status: DISCONTINUED | OUTPATIENT
Start: 2018-01-24 | End: 2018-01-25 | Stop reason: HOSPADM

## 2018-01-24 RX ORDER — FENTANYL CITRATE 50 UG/ML
INJECTION, SOLUTION INTRAMUSCULAR; INTRAVENOUS AS NEEDED
Status: DISCONTINUED | OUTPATIENT
Start: 2018-01-24 | End: 2018-01-24 | Stop reason: HOSPADM

## 2018-01-24 RX ORDER — DIPHENHYDRAMINE HYDROCHLORIDE 50 MG/ML
12.5 INJECTION, SOLUTION INTRAMUSCULAR; INTRAVENOUS AS NEEDED
Status: DISCONTINUED | OUTPATIENT
Start: 2018-01-24 | End: 2018-01-24 | Stop reason: HOSPADM

## 2018-01-24 RX ORDER — MIDAZOLAM HYDROCHLORIDE 1 MG/ML
INJECTION, SOLUTION INTRAMUSCULAR; INTRAVENOUS AS NEEDED
Status: DISCONTINUED | OUTPATIENT
Start: 2018-01-24 | End: 2018-01-24 | Stop reason: HOSPADM

## 2018-01-24 RX ORDER — LIDOCAINE HYDROCHLORIDE 10 MG/ML
0.1 INJECTION, SOLUTION EPIDURAL; INFILTRATION; INTRACAUDAL; PERINEURAL AS NEEDED
Status: DISCONTINUED | OUTPATIENT
Start: 2018-01-24 | End: 2018-01-24 | Stop reason: HOSPADM

## 2018-01-24 RX ORDER — OXYCODONE HYDROCHLORIDE 5 MG/1
10 TABLET ORAL
Status: DISCONTINUED | OUTPATIENT
Start: 2018-01-24 | End: 2018-01-25 | Stop reason: HOSPADM

## 2018-01-24 RX ORDER — FENTANYL CITRATE 50 UG/ML
25 INJECTION, SOLUTION INTRAMUSCULAR; INTRAVENOUS
Status: DISCONTINUED | OUTPATIENT
Start: 2018-01-24 | End: 2018-01-24 | Stop reason: HOSPADM

## 2018-01-24 RX ORDER — ACETAMINOPHEN 325 MG/1
650 TABLET ORAL EVERY 6 HOURS
Status: DISCONTINUED | OUTPATIENT
Start: 2018-01-24 | End: 2018-01-25 | Stop reason: HOSPADM

## 2018-01-24 RX ORDER — NITROFURANTOIN MACROCRYSTALS 50 MG/1
100 CAPSULE ORAL 2 TIMES DAILY
Status: DISCONTINUED | OUTPATIENT
Start: 2018-01-24 | End: 2018-01-24 | Stop reason: SDUPTHER

## 2018-01-24 RX ORDER — HYDROMORPHONE HYDROCHLORIDE 2 MG/ML
0.5 INJECTION, SOLUTION INTRAMUSCULAR; INTRAVENOUS; SUBCUTANEOUS
Status: DISCONTINUED | OUTPATIENT
Start: 2018-01-24 | End: 2018-01-25 | Stop reason: HOSPADM

## 2018-01-24 RX ORDER — DOCUSATE SODIUM 100 MG/1
100 CAPSULE, LIQUID FILLED ORAL 2 TIMES DAILY
Status: DISCONTINUED | OUTPATIENT
Start: 2018-01-24 | End: 2018-01-25 | Stop reason: HOSPADM

## 2018-01-24 RX ORDER — OXYCODONE HYDROCHLORIDE 5 MG/1
5 TABLET ORAL
Status: DISCONTINUED | OUTPATIENT
Start: 2018-01-24 | End: 2018-01-25 | Stop reason: HOSPADM

## 2018-01-24 RX ORDER — LIDOCAINE HYDROCHLORIDE 20 MG/ML
INJECTION, SOLUTION EPIDURAL; INFILTRATION; INTRACAUDAL; PERINEURAL AS NEEDED
Status: DISCONTINUED | OUTPATIENT
Start: 2018-01-24 | End: 2018-01-24 | Stop reason: HOSPADM

## 2018-01-24 RX ORDER — ROCURONIUM BROMIDE 10 MG/ML
INJECTION, SOLUTION INTRAVENOUS AS NEEDED
Status: DISCONTINUED | OUTPATIENT
Start: 2018-01-24 | End: 2018-01-24 | Stop reason: HOSPADM

## 2018-01-24 RX ORDER — MEDROXYPROGESTERONE ACETATE 2.5 MG/1
2.5 TABLET ORAL DAILY
Status: DISCONTINUED | OUTPATIENT
Start: 2018-01-24 | End: 2018-01-25 | Stop reason: HOSPADM

## 2018-01-24 RX ORDER — SODIUM CHLORIDE 0.9 % (FLUSH) 0.9 %
5-10 SYRINGE (ML) INJECTION EVERY 8 HOURS
Status: DISCONTINUED | OUTPATIENT
Start: 2018-01-24 | End: 2018-01-24 | Stop reason: HOSPADM

## 2018-01-24 RX ORDER — NEOSTIGMINE METHYLSULFATE 1 MG/ML
INJECTION INTRAVENOUS AS NEEDED
Status: DISCONTINUED | OUTPATIENT
Start: 2018-01-24 | End: 2018-01-24 | Stop reason: HOSPADM

## 2018-01-24 RX ADMIN — ROCURONIUM BROMIDE 5 MG: 10 INJECTION, SOLUTION INTRAVENOUS at 08:29

## 2018-01-24 RX ADMIN — FENTANYL CITRATE 100 MCG: 50 INJECTION, SOLUTION INTRAMUSCULAR; INTRAVENOUS at 08:29

## 2018-01-24 RX ADMIN — SODIUM CHLORIDE 125 ML/HR: 900 INJECTION, SOLUTION INTRAVENOUS at 10:28

## 2018-01-24 RX ADMIN — ROCURONIUM BROMIDE 10 MG: 10 INJECTION, SOLUTION INTRAVENOUS at 08:52

## 2018-01-24 RX ADMIN — Medication 80 MCG: at 09:19

## 2018-01-24 RX ADMIN — GLYCOPYRROLATE 0.4 MG: 0.2 INJECTION INTRAMUSCULAR; INTRAVENOUS at 09:44

## 2018-01-24 RX ADMIN — Medication 2 G: at 16:24

## 2018-01-24 RX ADMIN — Medication 2 G: at 08:44

## 2018-01-24 RX ADMIN — MIDAZOLAM HYDROCHLORIDE 2 MG: 1 INJECTION, SOLUTION INTRAMUSCULAR; INTRAVENOUS at 08:26

## 2018-01-24 RX ADMIN — FENTANYL CITRATE 25 MCG: 50 INJECTION, SOLUTION INTRAMUSCULAR; INTRAVENOUS at 10:07

## 2018-01-24 RX ADMIN — SUCCINYLCHOLINE CHLORIDE 140 MG: 20 INJECTION INTRAMUSCULAR; INTRAVENOUS at 08:29

## 2018-01-24 RX ADMIN — FENTANYL CITRATE 25 MCG: 50 INJECTION, SOLUTION INTRAMUSCULAR; INTRAVENOUS at 10:15

## 2018-01-24 RX ADMIN — DEXAMETHASONE SODIUM PHOSPHATE 8 MG: 4 INJECTION, SOLUTION INTRA-ARTICULAR; INTRALESIONAL; INTRAMUSCULAR; INTRAVENOUS; SOFT TISSUE at 08:36

## 2018-01-24 RX ADMIN — NEOSTIGMINE METHYLSULFATE 3 MG: 1 INJECTION INTRAVENOUS at 09:44

## 2018-01-24 RX ADMIN — OXYCODONE HYDROCHLORIDE 5 MG: 5 TABLET ORAL at 11:32

## 2018-01-24 RX ADMIN — OXYCODONE HYDROCHLORIDE 5 MG: 5 TABLET ORAL at 14:22

## 2018-01-24 RX ADMIN — Medication 1 LOZENGE: at 16:24

## 2018-01-24 RX ADMIN — FENTANYL CITRATE 25 MCG: 50 INJECTION, SOLUTION INTRAMUSCULAR; INTRAVENOUS at 10:12

## 2018-01-24 RX ADMIN — LIDOCAINE HYDROCHLORIDE 60 MG: 20 INJECTION, SOLUTION EPIDURAL; INFILTRATION; INTRACAUDAL; PERINEURAL at 08:29

## 2018-01-24 RX ADMIN — Medication 80 MCG: at 08:41

## 2018-01-24 RX ADMIN — NITROFURANTOIN (MONOHYDRATE/MACROCRYSTALS) 100 MG: 75; 25 CAPSULE ORAL at 16:23

## 2018-01-24 RX ADMIN — Medication 80 MCG: at 08:45

## 2018-01-24 RX ADMIN — Medication 1 LOZENGE: at 20:05

## 2018-01-24 RX ADMIN — Medication 80 MCG: at 08:35

## 2018-01-24 RX ADMIN — FENTANYL CITRATE 25 MCG: 50 INJECTION, SOLUTION INTRAMUSCULAR; INTRAVENOUS at 10:20

## 2018-01-24 RX ADMIN — ACETAMINOPHEN 1000 MG: 10 INJECTION, SOLUTION INTRAVENOUS at 09:42

## 2018-01-24 RX ADMIN — DOCUSATE SODIUM 100 MG: 100 CAPSULE, LIQUID FILLED ORAL at 16:24

## 2018-01-24 RX ADMIN — ACETAMINOPHEN 650 MG: 325 TABLET ORAL at 16:23

## 2018-01-24 RX ADMIN — SODIUM CHLORIDE, SODIUM LACTATE, POTASSIUM CHLORIDE, AND CALCIUM CHLORIDE 25 ML/HR: 600; 310; 30; 20 INJECTION, SOLUTION INTRAVENOUS at 07:47

## 2018-01-24 RX ADMIN — HYDROMORPHONE HYDROCHLORIDE 0.5 MG: 2 INJECTION, SOLUTION INTRAMUSCULAR; INTRAVENOUS; SUBCUTANEOUS at 09:46

## 2018-01-24 RX ADMIN — ONDANSETRON 4 MG: 2 INJECTION INTRAMUSCULAR; INTRAVENOUS at 09:34

## 2018-01-24 RX ADMIN — OXYCODONE HYDROCHLORIDE 10 MG: 5 TABLET ORAL at 20:05

## 2018-01-24 RX ADMIN — OXYCODONE HYDROCHLORIDE 5 MG: 5 TABLET ORAL at 16:24

## 2018-01-24 RX ADMIN — Medication 120 MCG: at 08:48

## 2018-01-24 RX ADMIN — ROCURONIUM BROMIDE 25 MG: 10 INJECTION, SOLUTION INTRAVENOUS at 08:43

## 2018-01-24 RX ADMIN — PROPOFOL 150 MG: 10 INJECTION, EMULSION INTRAVENOUS at 08:29

## 2018-01-24 NOTE — PROGRESS NOTES
Problem: Mobility Impaired (Adult and Pediatric)  Goal: *Acute Goals and Plan of Care (Insert Text)  Physical Therapy Goals  Initiated 1/24/2018    1. Patient will move from supine to sit and sit to supine , scoot up and down and roll side to side in bed with modified independence within 4 days. 2. Patient will perform sit to stand with independence within 4 days. 3. Patient will ambulate with independence for 250 feet with the least restrictive device within 4 days. 4. Patient will ascend/descend 4 stairs with 1 handrail(s) with independence within 4 days. 5. Patient will verbalize and demonstrate understanding of spinal precautions (No lifting greater than 10 lbs, log-roll technique; frequent repositioning as instructed) within 4 days. physical Therapy EVALUATION  Patient: Aixa Rodriguez (78 y.o. female)  Date: 1/24/2018  Primary Diagnosis: HERNIATED LUMBAR DISC LEFT  Herniation of lumbar intervertebral disc  Procedure(s) (LRB):  LUMBAR LAMINECTOMY AND DISCECTOMY L4-5 LEFT (Left) Day of Surgery   Precautions:    (no lifting 10#)    ASSESSMENT :  Based on the objective data described below, the patient presents with impaired functional mobility secondary to increased anxiety/fear of eliciting pain, numbness of L foot, generalized weakness, and decreased activity tolerance. Pt received supine in bed, agreeable to participation with therapy. Instructed pt in log roll technique with pt performing with CGA in order to assume seated position EOB. Pt initially complained of numbness of L foot however sensation intact to light touch and deep pressure with pt demonstrating appropriate control of L LE (demonstrated L quad set, L heel slide, L ankle pump, and L LAQ). Pt sit>>stand w/ CGA and ambulated 50ft w/ CGA and HHAx1. Gait speed decreased with a shuffled gait pattern. Lack of arm swing noted with pt holding BUEs in high guard position. No buckling of L LE throughout.  Pt EXTREMELY anxious/fearful throughout mobility which seemed to be biggest limitation to overall mobility. At completion of therapy session pt stated she felt like her L foot was \"frozen\" rather than numb and requested heating pad to \"thawl\" L foot (?). RN notified. All VSS throughout on RA. Anticipate that pt will continue to progress well with therapy and be appropriate to discharge home w/ HH PT vs none and assist of significant other. Patient will benefit from skilled intervention to address the above impairments. Patients rehabilitation potential is considered to be Good  Factors which may influence rehabilitation potential include:   [x]         None noted  []         Mental ability/status  []         Medical condition  []         Home/family situation and support systems  []         Safety awareness  []         Pain tolerance/management  []         Other:      PLAN :  Recommendations and Planned Interventions:  [x]           Bed Mobility Training             []    Neuromuscular Re-Education  [x]           Transfer Training                   []    Orthotic/Prosthetic Training  [x]           Gait Training                         []    Modalities  [x]           Therapeutic Exercises           []    Edema Management/Control  [x]           Therapeutic Activities            [x]    Patient and Family Training/Education  [x]           Other (comment): stair climbing    Frequency/Duration: Patient will be followed by physical therapy  twice daily to address goals. Discharge Recommendations: Home Health  Further Equipment Recommendations for Discharge: None     SUBJECTIVE:   Patient stated I have a history of passing out when I experience pain. I passed out after my daughter was born.     OBJECTIVE DATA SUMMARY:   HISTORY:    Past Medical History:   Diagnosis Date    Anxiety     Eczema     Herpes labialis     History of recurrent UTIs     sex related    Hormone replacement therapy     HPV (human papilloma virus) infection     HTN (hypertension)     LBBB (left bundle branch block)      Past Surgical History:   Procedure Laterality Date    HX ABDOMINAL LAPAROSCOPY      HX BREAST AUGMENTATION      HX COLONOSCOPY      HX TUBAL LIGATION      HX WRIST FRACTURE TX Right      Prior Level of Function/Home Situation: Independent w/ ambulation and ADLs. Denies history of falls. Still driving. States she was walking 3-4 miles/day for exercise up until the weather \"got cold. \" Reports \"weakness\" when stair climbing or walking up inclines. Personal factors and/or comorbidities impacting plan of care:     Home Situation  Home Environment: Private residence  # Steps to Enter: 4  Rails to Enter: Yes  Hand Rails : Bilateral  One/Two Story Residence: Two story, live on 1st floor  Lift Chair Available: No  Living Alone: No  Support Systems: Spouse/Significant Other/Partner  Patient Expects to be Discharged to[de-identified] Private residence  Current DME Used/Available at Home: None  Tub or Shower Type: Shower    EXAMINATION/PRESENTATION/DECISION MAKING:   Critical Behavior:  Neurologic State: Alert, Appropriate for age  Orientation Level: Appropriate for age, Oriented X4  Cognition: Appropriate decision making, Appropriate for age attention/concentration, Appropriate safety awareness, Follows commands     Hearing:   Auditory  Auditory Impairment: None  Hearing Aids/Status: Does not own  Skin:  Dressing clean, dry, intact  Edema: none noted   Range Of Motion:  AROM: Within functional limits                       Strength:    Strength: Generally decreased, functional                    Tone & Sensation:   Tone: Normal              Sensation: Impaired (numbness of L foot - new since surgery)               Coordination:  Coordination: Within functional limits  Vision:      Functional Mobility:  Bed Mobility:  Rolling: Contact guard assistance (log roll)  Supine to Sit: Contact guard assistance  Sit to Supine: Contact guard assistance  Scooting: Contact guard assistance  Transfers:  Sit to Stand: Contact guard assistance  Stand to Sit: Contact guard assistance                       Balance:   Sitting: Intact  Standing: Impaired  Standing - Static: Good  Standing - Dynamic : Fair  Ambulation/Gait Training:  Distance (ft): 50 Feet (ft)  Assistive Device: Gait belt (HHAx1)  Ambulation - Level of Assistance: Contact guard assistance        Gait Abnormalities: Decreased step clearance; Altered arm swing (lack of arm swing/arms held in high guard position)              Speed/Anika: Pace decreased (<100 feet/min); Shuffled  Step Length: Left shortened;Right shortened                      Functional Measure:  Barthel Index:    Bathin  Bladder: 10  Bowels: 10  Groomin  Dressin  Feeding: 10  Mobility: 0  Stairs: 0  Toilet Use: 5  Transfer (Bed to Chair and Back): 10  Total: 55       Barthel and G-code impairment scale:  Percentage of impairment CH  0% CI  1-19% CJ  20-39% CK  40-59% CL  60-79% CM  80-99% CN  100%   Barthel Score 0-100 100 99-80 79-60 59-40 20-39 1-19   0   Barthel Score 0-20 20 17-19 13-16 9-12 5-8 1-4 0      The Barthel ADL Index: Guidelines  1. The index should be used as a record of what a patient does, not as a record of what a patient could do. 2. The main aim is to establish degree of independence from any help, physical or verbal, however minor and for whatever reason. 3. The need for supervision renders the patient not independent. 4. A patient's performance should be established using the best available evidence. Asking the patient, friends/relatives and nurses are the usual sources, but direct observation and common sense are also important. However direct testing is not needed. 5. Usually the patient's performance over the preceding 24-48 hours is important, but occasionally longer periods will be relevant. 6. Middle categories imply that the patient supplies over 50 per cent of the effort. 7. Use of aids to be independent is allowed. Fang Redmond., Barthel, D. W. ([id number]). Functional evaluation: the Barthel Index. [address] [facility] (14)2. LAILA Carrillo, Cheng Null Martell, [address] (1999). Measuring the change indisability after inpatient rehabilitation; comparison of the responsiveness of the Barthel Index and Functional Waller Measure. Journal of Neurology, Neurosurgery, and Psychiatry, 66(4), 291-277. MARYURI Barba, GABY Pérez, & India Tang M.A. (2004.) Assessment of post-stroke quality of life in cost-effectiveness studies: The usefulness of the Barthel Index and the EuroQoL-5D. Quality of Life Research, 13, 380-33       G codes: In compliance with CMSs Claims Based Outcome Reporting, the following G-code set was chosen for this patient based on their primary functional limitation being treated: The outcome measure chosen to determine the severity of the functional limitation was the Barthel Index with a score of 55/100 which was correlated with the impairment scale.     ? Mobility - Walking and Moving Around:     - CURRENT STATUS: CK - 40%-59% impaired, limited or restricted    - GOAL STATUS: CI - 1%-19% impaired, limited or restricted    - D/C STATUS:  ---------------To be determined---------------      Physical Therapy Evaluation Charge Determination   History Examination Presentation Decision-Making   MEDIUM  Complexity : 1-2 comorbidities / personal factors will impact the outcome/ POC  MEDIUM Complexity : 3 Standardized tests and measures addressing body structure, function, activity limitation and / or participation in recreation  MEDIUM Complexity : Evolving with changing characteristics  MEDIUM Complexity : FOTO score of 26-74      Based on the above components, the patient evaluation is determined to be of the following complexity level: MEDIUM    Pain:  Pain Scale 1: Numeric (0 - 10)  Pain Intensity 1: 6  Pain Location 1: Back  Pain Orientation 1: Lower  Pain Description 1: Sore  Pain Intervention(s) 1: Medication (see MAR)  Activity Tolerance:   VSS throughout on RA  Please refer to the flowsheet for vital signs taken during this treatment. After treatment:   []         Patient left in no apparent distress sitting up in chair  [x]         Patient left in no apparent distress in bed  [x]         Call bell left within reach  [x]         Nursing notified  []         Caregiver present  []         Bed alarm activated    COMMUNICATION/EDUCATION:   The patients plan of care was discussed with: Registered Nurse. [x]         Fall prevention education was provided and the patient/caregiver indicated understanding. [x]         Patient/family have participated as able in goal setting and plan of care. [x]         Patient/family agree to work toward stated goals and plan of care. []         Patient understands intent and goals of therapy, but is neutral about his/her participation. []         Patient is unable to participate in goal setting and plan of care.     Thank you for this referral.  Shiv Guerrero, PT, DPT   Time Calculation: 29 mins

## 2018-01-24 NOTE — PROGRESS NOTES
Ortho/ NeuroSurgery NP Note    POD# 0  s/p LUMBAR LAMINECTOMY AND DISCECTOMY L4-5 LEFT     Pt resting in bed. No complaints. Pain when sitting. Patient reports \"back weakness\" when going \"up\" (like uphill or stairs). A&O x4   VSS Afebrile. Patient has had something to eat. No nausea. Most Recent Labs:   Lab Results   Component Value Date/Time    HGB 12.2 01/16/2018 03:39 PM    Hemoglobin A1c 4.8 01/16/2018 03:39 PM       MRSA Screen Pre-op Negative  U/A Screen Pre-Op Positive Possible enterococcus. Treated with Macrobid. Body mass index is 24.03 kg/(m^2). BMI greater than 30 is classified as obesity and greater than 40 is classified as morbid obesity. STOP BANG Score: 2  Incentive Spirometer Pre-Op Volume: Not recorded. Social History: No significant history. Wilson out. Dressing c.d.i    Calves soft and supple; No pain with passive stretch  Sensation and motor intact  SCDs for mechanical DVT proph    Plan:  1) PT BID starting today  2) Marci-op Antibiotics Ancef  3) Discharge plans to home with her significant other. Niles Richards NP     6721:  Nursing notified NP of patient's inquiry about Ambien. Educated on the risks associated with Ambien and our program consensus to not use the medication inpatient. This was passed on to the patient who was unhappy as she reports she is \"addicted\" to it. Offered Melatonin and more education.  [facility] notified and in agreement, NO Ambien.      Niles Richards NP

## 2018-01-24 NOTE — IP AVS SNAPSHOT
[address] [facility] 
[phone] Patient: Varsha MRN: [id number] JMN:[id number] About your hospitalization You were admitted on:  January 24, 2018 You last received care in the:  [facility] [place] [facility] You were discharged on:  January 25, 2018 Why you were hospitalized Your primary diagnosis was:  Status Post Lumbar Spine Surgery For Decompression Of Spinal Cord Your diagnoses also included:  Herniation Of Lumbar Intervertebral Disc Follow-up Information Follow up With Details Comments Contact Info Robert Mills MD Schedule an appointment as soon as possible for a visit in 2 weeks  [address] [place] [address] Arie Tian 13 
[phone] Jennifer Marroquin MD   [address] [facility] [address] [facility] 
[phone] Discharge Orders None A check kannan indicates which time of day the medication should be taken. My Medications START taking these medications Instructions Each Dose to Equal  
 Morning Noon Evening Bedtime  
 oxyCODONE-acetaminophen 5-325 mg per tablet Commonly known as:  PERCOCET Your last dose was: Your next dose is: Take 1 Tab by mouth every four (4) hours as needed for Pain. Max Daily Amount: 6 Tabs. 1 Tab  
    
   
   
   
  
 polyethylene glycol 17 gram/dose powder Commonly known as:  Judye Gaster Your last dose was: Your next dose is: Take 17 g by mouth daily for 15 days. 17 g  
    
   
   
   
  
 senna-docusate 8.6-50 mg per tablet Commonly known as:  SENNA PLUS Your last dose was: Your next dose is: Take 1 Tab by mouth two (2) times a day. 1 Tab CHANGE how you take these medications Instructions Each Dose to Equal  
 Morning Noon Evening Bedtime  
 zolpidem 10 mg tablet Commonly known as:  AMBIEN  
 What changed:  additional instructions Your last dose was: Your next dose is: Take 1 Tab by mouth once for 1 dose. Max Daily Amount: 10 mg. Avoid taking while on Opioid pain med. 10 mg CONTINUE taking these medications Instructions Each Dose to Equal  
 Morning Noon Evening Bedtime  
 cranberry extract 450 mg Tab tablet Your last dose was: Your next dose is: Take 450 mg by mouth two (2) times a day. 450 mg  
    
   
   
   
  
 cyanocobalamin (vitamin B-12) 3,000 mcg Lozg Your last dose was: Your next dose is:    
   
   
 2,000 mcg by SubLINGual route daily. 2000 mcg  
    
   
   
   
  
 escitalopram oxalate 10 mg tablet Commonly known as:  Guille Salvia Your last dose was: Your next dose is: Take 10 mg by mouth daily. 10 mg  
    
   
   
   
  
 estradiol 0.5 mg tablet Commonly known as:  ESTRACE Your last dose was: Your next dose is: Take  by mouth daily. losartan 100 mg tablet Commonly known as:  COZAAR Your last dose was: Your next dose is: Take 100 mg by mouth daily. 100 mg  
    
   
   
   
  
 medroxyPROGESTERone 2.5 mg tablet Commonly known as:  PROVERA Your last dose was: Your next dose is: Take 2.5 mg by mouth daily. 2.5 mg  
    
   
   
   
  
 nitrofurantoin (macrocrystal-monohydrate) 100 mg capsule Commonly known as:  MACROBID Your last dose was: Your next dose is: Take 1 Cap by mouth two (2) times a day for 7 days. Indications: ENTEROCOCCUS URINARY TRACT INFECTION  
 100 mg  
    
   
   
   
  
 omeprazole 20 mg capsule Commonly known as:  PRILOSEC Your last dose was: Your next dose is: Take 20 mg by mouth daily as needed. 20 mg  PROBIOTIC FORMULA PO  
   
 Your last dose was: Your next dose is: Take 1 Tab by mouth daily. 1 Tab  
    
   
   
   
  
 triamcinolone acetonide 0.1 % topical cream  
Commonly known as:  KENALOG Your last dose was: Your next dose is:    
   
   
 Apply  to affected area two (2) times a day. use thin layer  
     
   
   
   
  
 valACYclovir 1 gram tablet Commonly known as:  VALTREX Your last dose was: Your next dose is:    
   
   
 daily. STOP taking these medications   
 nitrofurantoin 100 mg capsule Commonly known as:  MACRODANTIN Where to Get Your Medications Information on where to get these meds will be given to you by the nurse or doctor. ! Ask your nurse or doctor about these medications  
  oxyCODONE-acetaminophen 5-325 mg per tablet  
 polyethylene glycol 17 gram/dose powder  
 senna-docusate 8.6-50 mg per tablet Discharge Instructions SPINE DISCHARGE  INSTRUCTIONS Peace Olivera M.D. What can I do? ? The only exercise you should do is walking. Start by walking twice a day for 5 minutes, then increase by  2-3 minutes every day until you reach 25 minutes twice a day. DO NOT sit for long periods of time (20 minutes at a time for the first couple of weeks, then gradually increase. ? No heavy lifting (5 lbs max); no straining, twisting, or bending. ? No driving until your physician tells you it is ok. It is, however, ok to ride short distances in a car. 
? If you are required to wear a back brace, you may remove it when you are sleeping unless your doctor has advised against it. ? If you are required to wear a cervical collar, you must sleep in it. You can remove it only for showers. What can I eat?  
? You may resume your regular home diet as tolerated. If your throat is sore, you may want to eat soft food for the first few days. When can I take a shower? ? You may shower leaving on your occlusive (waterproof) dressing on allowing water to run over the dressing. The dressing may be removed in 5 days. The small pieces of tape (steri strips)  underneath it should stay on. Let water run over them, then pat dry gently. ? Do not take baths or soak in pools. ? You may remove your brace for showering. Medications: 
? Check with your physician before taking any anti-inflammatory medicines (Advil, Aleve, ibuprofen, aspirin). ? Take prescription medicine as directed. DO NOT take more than prescribed. Call your physician if the prescribed dose is not sufficiently controlling your pain. ? It is important to have regular bowel movements. Pain medications may cause constipation. Drink plenty of fluids, get enough fiber in your diet, and use stool softeners and drink prune juice to help prevent constipation. Do not take laxatives if at all possible except in severe situations. It can result in a vicious cycle of constipation and diarrhea. ? Do not put any ointments or creams on your incision unless directed to do so by your physician. ? Tobacco products should be avoided during the postoperative phase. When do I see the physician again? ? Please call your physicians office as soon as you get home to schedule your 1st post operative appointment. You should be seen approximately two weeks after your surgery. At this appointment you will see your doctors Assistant for a wound check and to answer any questions you may have. 
? You will see your physician approximately six weeks after your surgery. ? If you had a fusion, you will need to get an order for xrays to be taken prior to the six week appointment. These should be done on the day of the appointment or 1-2 days before. ? If you need the number to your doctors office, please request it from your nurse or see below. NOTIFY YOUR PHYSICIAN OF ANY OF THE FOLLOWING: 
 
? Fever above 101º for 24 hrs. ? Nausea or vomiting. 
? Inability to urinate ? Loss of bowel or bladder function (sudden onset of incontinence) ? Changes in sensation (numbness, tingling, color change) in your extremities ? Pain not relieved by your medicine. ? Redness, swelling or drainage from your incision ? Persistent pain in the calf of either leg ? Development of severe pain FOR APPOINTMENTS OR QUESTIONS CALL: 
 
Neurosurgical [facility] [address] [place] [address] 
[phone] ACO Transitions of Care Introducing [facility] [address] offers a voluntary care coordination program to provide high quality service and care to [facility] fee-for-service beneficiaries. Jah Mendes was designed to help you enhance your health and well-being through the following services: ? Transitions of Care  support for individuals who are transitioning from one care setting to another (example: Hospital to home). ? Chronic and Complex Care Coordination  support for individuals and caregivers of those with serious or chronic illnesses or with more than one chronic (ongoing) condition and those who take a number of different medications. If you meet specific medical criteria, a [address] [place] [address] Rd may call you directly to coordinate your care with your primary care physician and your other care providers. For questions about the [facility] programs, please, contact your physicians office. For general questions or additional information about Accountable Care Organizations: 
Please visit www.medicare.gov/acos. html or call 1-800-MEDICARE (3-611.213.6489) TTY users should call 8-265.429.7721. Introducing [facility] [place] [facility]! Clover Harmon introduces [employer] patient portal. Now you can access parts of your medical record, email your doctor's office, and request medication refills online. 1. In your internet browser, go to [email or web address] 2. Click on the First Time User? Click Here link in the Sign In box. You will see the New Member Sign Up page. 3. Enter your Yolto Access Code exactly as it appears below. You will not need to use this code after youve completed the sign-up process. If you do not sign up before the expiration date, you must request a new code. · Yolto Access Code: [email or web address] Expires: 2/13/2018  4:52 PM 
 
4. Enter the last four digits of your Social Security Number (xxxx) and Date of Birth (mm/dd/yyyy) as indicated and click Submit. You will be taken to the next sign-up page. 5. Create a [email or web address] ID. This will be your Yolto login ID and cannot be changed, so think of one that is secure and easy to remember. 6. Create a Yolto password. You can change your password at any time. 7. Enter your Password Reset Question and Answer. This can be used at a later time if you forget your password. 8. Enter your e-mail address. You will receive e-mail notification when new information is available in [address] 9. Click Sign Up. You can now view and download portions of your medical record. 10. Click the Download Summary menu link to download a portable copy of your medical information. If you have questions, please visit the Frequently Asked Questions section of the Yolto website. Remember, Yolto is NOT to be used for urgent needs. For medical emergencies, dial 911. Now available from your iPhone and Android! Providers Seen During Your Hospitalization Provider Specialty Primary office phone Andreina Hughes MD Neurosurgery [phone] Your Primary Care Physician (PCP) Primary Care Physician Office Phone Office Fax Oksana Welsh [phone] You are allergic to the following Allergen Reactions Sulfa (Sulfonamide Antibiotics) Itching Recent Documentation Height Weight BMI OB Status Smoking Status 1.651 m 65.5 kg 24.03 kg/m2 Menopause Never Smoker Emergency Contacts Name Discharge Info Relation Home Work Mobile [facility] CAREGIVER [3] Daughter [21] [phone] Petra Loco  Child [2] [phone] Patient Belongings The following personal items are in your possession at time of discharge: 
  Dental Appliances: None  Visual Aid: None   Hearing Aids/Status: Does not own  Home Medications: None   Jewelry: None  Clothing: Socks, Footwear, Undergarments, Pants, Shirt, Pajamas, Other (comment) (scarf & vest to [facility])    Other Valuables: None  Personal Items Sent to Safe: Declines Please provide this summary of care documentation to your next provider. Signatures-by signing, you are acknowledging that this After Visit Summary has been reviewed with you and you have received a copy. Patient Signature:  ____________________________________________________________ Date:  ____________________________________________________________  
  
Jann Snyder Provider Signature:  ____________________________________________________________ Date:  ____________________________________________________________

## 2018-01-24 NOTE — BRIEF OP NOTE
BRIEF OPERATIVE NOTE    Date of Procedure: 1/24/2018   Preoperative Diagnosis: HERNIATED LUMBAR DISC LEFT  Postoperative Diagnosis: HERNIATED LUMBAR DISC, LEFT    Procedure(s):  LUMBAR LAMINECTOMY AND DISCECTOMY L4-5 LEFT  Surgeon(s) and Role:     * Heather Keenan MD - Primary         Assistant Staff: None      Surgical Staff:  Circ-1: Nighat Wallis RN  Circ-Relief: Vitor Alvarez RN  Radiology Technician: Diana Baker,   Scrub Tech-1: Trell Leary  Scrub RN - Intern: Pete Kennedy  Surg Asst-1: Susan New Staff:  Norma Celeste RN  Event Time In   Incision Start 0900   Incision Close 0866     Anesthesia: General   Estimated Blood Loss: none  Specimens:   ID Type Source Tests Collected by Time Destination   1 : Papoose straph muscle biopsy Fresh Lumbar  Heather Keenan MD 1/24/2018 9364 Pathology      Findings: disc herniation was larger at L4,5 that appreciated on the MRI  During initial dissection down to lamina, rather than discard a small piece of muscle about 1 cm in length, I sent it as a biopsy specimen  At request of neurologist who initially was thinking of a primary muscle disease in this pt  Complications: none  Implants: * No implants in log *

## 2018-01-24 NOTE — ANESTHESIA PREPROCEDURE EVALUATION
Anesthetic History   No history of anesthetic complications            Review of Systems / Medical History  Patient summary reviewed, nursing notes reviewed and pertinent labs reviewed    Pulmonary  Within defined limits                 Neuro/Psych             Comments: Lumbar HNP  Anxiety Cardiovascular  Within defined limits  Hypertension: well controlled        Dysrhythmias       Exercise tolerance: >4 METS  Comments: TTE (3/18/15):  Grade I diastolic dysfunction, mild MR, EF=55-60%   GI/Hepatic/Renal               Comments: H/O Recurrent UTI's Endo/Other  Within defined limits           Other Findings            Physical Exam    Airway  Mallampati: II  TM Distance: > 6 cm  Neck ROM: normal range of motion   Mouth opening: Normal     Cardiovascular  Regular rate and rhythm,  S1 and S2 normal,  no murmur, click, rub, or gallop             Dental  No notable dental hx       Pulmonary  Breath sounds clear to auscultation               Abdominal  GI exam deferred       Other Findings            Anesthetic Plan    ASA: 2  Anesthesia type: general          Induction: Intravenous  Anesthetic plan and risks discussed with: Patient

## 2018-01-24 NOTE — PROGRESS NOTES
TRANSFER - IN REPORT:    Verbal report received from Diane Mccallum RN (name) on [facility]  being received from PACU (unit) for routine post - op      Report consisted of patients Situation, Background, Assessment and   Recommendations(SBAR). Information from the following report(s) SBAR, Kardex and MAR was reviewed with the receiving nurse. Opportunity for questions and clarification was provided. Assessment completed upon patients arrival to unit and care assumed.

## 2018-01-24 NOTE — PROGRESS NOTES
Patient teaching regarding incentive spirometer. Teaching included rationale for use as well as instructions for how to use. The patient provided correct teach back and tolerated well.

## 2018-01-24 NOTE — PERIOP NOTES
TRANSFER - OUT REPORT:    Verbal report given to Nilo Villatoro RN on [facility]  being transferred to [facility] for routine post - op       Report consisted of patients Situation, Background, Assessment and   Recommendations(SBAR). Information from the following report(s) SBAR, Kardex, Procedure Summary, Intake/Output, MAR, Accordion and Cardiac Rhythm sinus rhythm was reviewed with the receiving nurse. Opportunity for questions and clarification was provided.       Patient transported with:   O2 @ 2 liters  Tech   Patient chart and belongings

## 2018-01-24 NOTE — OP NOTES
[facility]  ACUTE CARE OP NOTE    Rhiannon Donal  MR#: [id number]  : 1950  ACCOUNT #: [de-identified]   DATE OF SERVICE: 2018    PREOPERATIVE DIAGNOSIS:  Herniated disk, L4-L5 on the left, and leg weakness. POSTOPERATIVE DIAGNOSIS:  Herniated disk, L4-L5 on the left, and leg weakness. PROCEDURE PERFORMED:  Lumbar laminectomy and diskectomy, L4-L5. SURGEON:  Raj Curry MD    ASSISTANT:      ESTIMATED BLOOD LOSS:  Minimal.    ANESTHESIA:  General.    COMPLICATIONS:  None. SPECIMENS REMOVED:  Muscle, dorsal spine. IMPLANTS:  none    OPERATIVE PROCEDURE:  Review of imaging studies revealed a disk herniation at the L4-L5 interspace on the left side in this patient, who had a rather unconventional presentation with weakness as she walked upstairs in the lower extremities, primarily on the left side. She initially was referred for a possible muscle biopsy and/or nerve biopsy, but the findings on the imaging studies, which were confirmed by the radiologist, and noted by the radiologist of a disk herniation, called into question the disease process. After discussing this with Dr. Dhruv Hughes in neurology, and discussing risks, benefits and alternatives with the patient, including the fact that surgery may not improve her symptoms, she agreed to proceed, and myself and Dr. Dhruv Hughes agreed that we should proceed with the laminectomy and diskectomy first before proceeding potentially with a muscle biopsy, although the intent was that during surgery, I could send some of the local muscle during the dissection for biopsy as well. She agreed to this, and was taken to the operating room, where she was induced under general endotracheal anesthesia and placed on the operating table in the prone position on chest rolls. The lumbar region was scrubbed, prepped and draped in the usual sterile fashion.   A small midline vertical incision was made and carried down to the paraspinal muscle fascia. The muscle was taken down in a subperiosteal fashion off of L4 and L5. Palate clamp was applied to the spinous process of L5, and an x-ray was obtained to confirm position. During the subperiosteal dissection, I was able to take a very small piece of the dorsal muscle, about a centimeter in length, and send that as a specimen for muscle biopsy. Under loupe magnification, a hemilaminectomy was performed in the usual fashion of L4 on the left side. After confirming the position with the x-ray, the facet joint was noted to be significantly hypertrophied, even much more so than was noted on the imaging studies, particularly inferior facet joint of L4. A laminectomy proceeded, including removing the ligamentum flavum at the L4-L5 interspace level. The medial aspect of the facet joint was shaved down in order to improve the exposure and decompress the lateral aspect of the canal.  A foraminotomy over the L5 root was performed in order to decompress that root. The root was retracted medially, and under loupe magnification, it became visible that the disk was in fact herniated posteriorly up against the nerve root, although it had not ruptured through the annulus. The disk space was incised with a #15 blade scalpel while protecting the nerve root with a Love nerve root retractor. Disk material was removed with pituitary rongeurs, until the nerve root and thecal sac was seen to occupy more normal anatomical shape and position, and the lateral aspect of the disk was removed as well in order to decompress the lateral area, that would include the L4 root. The area was irrigated copiously with antibiotic irrigation. A small amount of FloSeal was placed over the laminectomy site. Bleeding was minimal and easily controlled with bipolar electrocautery.   The fascia was closed with 0 Vicryl, the subcutaneous tissue with 3-0 interrupted inverted Vicryl sutures, and a subcuticular running 4-0 Monocryl and Steri-Strips applied. Needle, sponge and instrument counts were correct at the end of the procedure.       MD DAIANA Lynch / BAKARI  D: 01/24/2018 10:16     T: 01/24/2018 12:18  JOB #: [id number]  CC: Rebecca Savage MD

## 2018-01-24 NOTE — PROGRESS NOTES
Family updated at 57 by Bernadette Murillo. Information provided to the patient's Mehdi Jolly. No questions or concerns were expressed at the time of the update.

## 2018-01-24 NOTE — ANESTHESIA POSTPROCEDURE EVALUATION
Post-Anesthesia Evaluation and Assessment    Patient: Dalton Allen MRN: [id number]  SSN: [id number]    YOB: 1950  Age: 78 y.o. Sex: female       Cardiovascular Function/Vital Signs  Visit Vitals    /55 (BP 1 Location: Left arm, BP Patient Position: At rest)    Pulse 75    Temp 37.1 °C (98.8 °F)    Resp 17    Ht 5' 5\" (1.651 m)    Wt 65.5 kg (144 lb 6.4 oz)    SpO2 93%    BMI 24.03 kg/m2       Patient is status post general anesthesia for Procedure(s):  LUMBAR LAMINECTOMY AND DISCECTOMY L4-5 LEFT. Nausea/Vomiting: None    Postoperative hydration reviewed and adequate. Pain:  Pain Scale 1: Numeric (0 - 10) (01/24/18 1030)  Pain Intensity 1: 3 (01/24/18 1030)   Managed    Neurological Status:   Neuro (WDL): Exceptions to WDL (01/24/18 0957)  Neuro  Neurologic State: Drowsy (01/24/18 0957)  Orientation Level: Oriented X4 (01/24/18 0957)  Cognition: Decreased attention/concentration; Follows commands (01/24/18 0957)  Speech: Clear;Delayed responses (01/24/18 0957)  LUE Motor Response: Purposeful (01/24/18 0957)  LLE Motor Response: Purposeful (01/24/18 0957)  RUE Motor Response: Purposeful (01/24/18 0957)  RLE Motor Response: Purposeful (01/24/18 0957)   At baseline    Mental Status and Level of Consciousness: Arousable    Pulmonary Status:   O2 Device: Nasal cannula (01/24/18 1030)   Adequate oxygenation and airway patent    Complications related to anesthesia: None    Post-anesthesia assessment completed.  No concerns    Signed By: Tere Sales MD     January 24, 2018

## 2018-01-24 NOTE — PROGRESS NOTES
Handoff Report from Operating Room to PACU    Report received from Amanuel Kirkpatrick RN and TREVIN Sargent regarding [place]. Surgeon(s):  Fidelia Restrepo MD  And Procedure(s) (LRB):  LUMBAR LAMINECTOMY AND DISCECTOMY L4-5 LEFT (Left)  confirmed   with allergies and dressings discussed. Anesthesia type, drugs, patient history, complications, estimated blood loss, vital signs, intake and output, and last pain medication, lines, reversal medications and temperature were reviewed.

## 2018-01-24 NOTE — PERIOP NOTES
Patient: Narcisa Pope MRN: [id number]  SSN: [id number]   YOB: 1950  Age: 78 y.o. Sex: female     Patient is status post Procedure(s):  LUMBAR LAMINECTOMY AND DISCECTOMY L4-5 LEFT. Surgeon(s) and Role:     * Cheri Chau MD - Primary    Local/Dose/Irrigation:  6 ml local injeciton                  Peripheral IV 01/24/18 Right Hand (Active)   Site Assessment Clean, dry, & intact 1/24/2018  7:46 AM   Phlebitis Assessment 0 1/24/2018  7:46 AM   Infiltration Assessment 0 1/24/2018  7:46 AM   Dressing Status Clean, dry, & intact 1/24/2018  7:46 AM   Dressing Type Transparent;Tape 1/24/2018  7:46 AM   Hub Color/Line Status Pink; Infusing 1/24/2018  7:46 AM                           Dressing/Packing:  Wound Back Lower-DRESSING TYPE: Sutures; Other (Comment) (honeycomb dressing) (01/24/18 0942)  Splint/Cast:  ]    Other:  none

## 2018-01-24 NOTE — PROGRESS NOTES
Rec'd pt on unit from PACU. Pt oriented to room and unit. Pt states \"discomfort\" but not pain /n/v at this time. Completed all admission assessment and database at this time. Call bell within reach.     Primary Nurse John Johnson RN and Dottie Viramontes RN performed a dual skin assessment on this patient No impairment noted  Mal score is 23    Post-op lumber surg, honeycomb dsg noted to lower back, dr/intact

## 2018-01-25 VITALS
HEART RATE: 86 BPM | OXYGEN SATURATION: 94 % | BODY MASS INDEX: 24.06 KG/M2 | RESPIRATION RATE: 18 BRPM | HEIGHT: 65 IN | DIASTOLIC BLOOD PRESSURE: 64 MMHG | SYSTOLIC BLOOD PRESSURE: 134 MMHG | TEMPERATURE: 98 F | WEIGHT: 144.4 LBS

## 2018-01-25 PROCEDURE — 74011250637 HC RX REV CODE- 250/637: Performed by: NEUROLOGICAL SURGERY

## 2018-01-25 PROCEDURE — 74011250636 HC RX REV CODE- 250/636: Performed by: NEUROLOGICAL SURGERY

## 2018-01-25 PROCEDURE — 74011250637 HC RX REV CODE- 250/637: Performed by: NURSE PRACTITIONER

## 2018-01-25 PROCEDURE — 97530 THERAPEUTIC ACTIVITIES: CPT

## 2018-01-25 PROCEDURE — 97116 GAIT TRAINING THERAPY: CPT

## 2018-01-25 PROCEDURE — 99218 HC RM OBSERVATION: CPT

## 2018-01-25 RX ORDER — POLYETHYLENE GLYCOL 3350 17 G/17G
17 POWDER, FOR SOLUTION ORAL DAILY
Qty: 255 G | Refills: 0 | Status: SHIPPED | OUTPATIENT
Start: 2018-01-25 | End: 2018-02-09

## 2018-01-25 RX ORDER — AMOXICILLIN 250 MG
1 CAPSULE ORAL 2 TIMES DAILY
Qty: 60 TAB | Refills: 0 | Status: SHIPPED | OUTPATIENT
Start: 2018-01-25

## 2018-01-25 RX ORDER — OXYCODONE AND ACETAMINOPHEN 5; 325 MG/1; MG/1
1 TABLET ORAL
Qty: 60 TAB | Refills: 0 | Status: SHIPPED | OUTPATIENT
Start: 2018-01-25

## 2018-01-25 RX ORDER — ZOLPIDEM TARTRATE 10 MG/1
10 TABLET ORAL ONCE
Qty: 1 TAB | Refills: 0 | Status: SHIPPED | COMMUNITY
Start: 2018-01-25

## 2018-01-25 RX ADMIN — PANTOPRAZOLE SODIUM 40 MG: 40 TABLET, DELAYED RELEASE ORAL at 06:33

## 2018-01-25 RX ADMIN — OXYCODONE HYDROCHLORIDE 10 MG: 5 TABLET ORAL at 03:02

## 2018-01-25 RX ADMIN — VALSARTAN 160 MG: 160 TABLET, FILM COATED ORAL at 09:48

## 2018-01-25 RX ADMIN — MEDROXYPROGESTERONE ACETATE 2.5 MG: 2.5 TABLET ORAL at 09:49

## 2018-01-25 RX ADMIN — ACETAMINOPHEN 650 MG: 325 TABLET ORAL at 06:33

## 2018-01-25 RX ADMIN — OXYCODONE HYDROCHLORIDE 10 MG: 5 TABLET ORAL at 06:33

## 2018-01-25 RX ADMIN — NITROFURANTOIN (MONOHYDRATE/MACROCRYSTALS) 100 MG: 75; 25 CAPSULE ORAL at 09:48

## 2018-01-25 RX ADMIN — ESCITALOPRAM OXALATE 10 MG: 10 TABLET ORAL at 09:49

## 2018-01-25 RX ADMIN — Medication 10 ML: at 06:34

## 2018-01-25 RX ADMIN — ESTRADIOL 0.5 MG: 1 TABLET ORAL at 09:49

## 2018-01-25 RX ADMIN — DOCUSATE SODIUM 100 MG: 100 CAPSULE, LIQUID FILLED ORAL at 09:48

## 2018-01-25 RX ADMIN — Medication 2 G: at 00:11

## 2018-01-25 RX ADMIN — OXYCODONE HYDROCHLORIDE 10 MG: 5 TABLET ORAL at 09:49

## 2018-01-25 RX ADMIN — ACETAMINOPHEN 650 MG: 325 TABLET ORAL at 00:10

## 2018-01-25 RX ADMIN — OXYCODONE HYDROCHLORIDE 10 MG: 5 TABLET ORAL at 00:10

## 2018-01-25 NOTE — PROGRESS NOTES
Reviewed discharge instructions, prescriptions, and side effects with patient and her . Reviewed wound care, follow-up instructions, and medication instructions. Answered all questions and provided contact information for future questions. Took IV out per policy, Catheter tip intact. Going home in a car with family.

## 2018-01-25 NOTE — DISCHARGE INSTRUCTIONS
Daisy Romano M.D. What can I do?  The only exercise you should do is walking. Start by walking twice a day for 5 minutes, then increase by  2-3 minutes every day until you reach 25 minutes twice a day. DO NOT sit for long periods of time (20 minutes at a time for the first couple of weeks, then gradually increase.  No heavy lifting (5 lbs max); no straining, twisting, or bending.  No driving until your physician tells you it is ok. It is, however, ok to ride short distances in a car.  If you are required to wear a back brace, you may remove it when you are sleeping unless your doctor has advised against it.  If you are required to wear a cervical collar, you must sleep in it. You can remove it only for showers. What can I eat?  You may resume your regular home diet as tolerated. If your throat is sore, you may want to eat soft food for the first few days. When can I take a shower?  You may shower leaving on your occlusive (waterproof) dressing on allowing water to run over the dressing. The dressing may be removed in 5 days. The small pieces of tape (steri strips)  underneath it should stay on. Let water run over them, then pat dry gently.  Do not take baths or soak in pools.  You may remove your brace for showering. Medications:   Check with your physician before taking any anti-inflammatory medicines (Advil, Aleve, ibuprofen, aspirin).  Take prescription medicine as directed. DO NOT take more than prescribed. Call your physician if the prescribed dose is not sufficiently controlling your pain.  It is important to have regular bowel movements. Pain medications may cause constipation. Drink plenty of fluids, get enough fiber in your diet, and use stool softeners and drink prune juice to help prevent constipation. Do not take laxatives if at all possible except in severe situations.   It can result in a vicious cycle of constipation and diarrhea.  Do not put any ointments or creams on your incision unless directed to do so by your physician.  Tobacco products should be avoided during the postoperative phase. When do I see the physician again?  Please call your physicians office as soon as you get home to schedule your 1st post operative appointment. You should be seen approximately two weeks after your surgery. At this appointment you will see your doctors Assistant for a wound check and to answer any questions you may have.  You will see your physician approximately six weeks after your surgery.  If you had a fusion, you will need to get an order for xrays to be taken prior to the six week appointment. These should be done on the day of the appointment or 1-2 days before.  If you need the number to your doctors office, please request it from your nurse or see below. NOTIFY YOUR PHYSICIAN OF ANY OF THE FOLLOWING:     Fever above 101º for 24 hrs.  Nausea or vomiting.  Inability to urinate   Loss of bowel or bladder function (sudden onset of incontinence)   Changes in sensation (numbness, tingling, color change) in your extremities   Pain not relieved by your medicine.    Redness, swelling or drainage from your incision   Persistent pain in the calf of either leg   Development of severe pain      FOR APPOINTMENTS OR QUESTIONS CALL:    [facility]  [address]  [phone]

## 2018-01-25 NOTE — DISCHARGE SUMMARY
Spine Discharge Summary    Patient ID:  Dionte Ryan  [id number]  female  78 y.o.  1950    Admit date: 2018    Discharge date: 2018    Admitting Physician: Cecilio Juarez MD     Consulting Physician(s):   Treatment Team: Attending Provider: Cecilio Juarez MD; Utilization Review: Ashley Pereira    Date of Surgery:   2018     Preoperative Diagnosis:  HERNIATED LUMBAR DISC LEFT    Postoperative Diagnosis:   HERNIATED LUMBAR DISC, LEFT    Procedure(s):  LUMBAR LAMINECTOMY AND DISCECTOMY L4-5 LEFT     Anesthesia Type:   General     Surgeon: Cecilio Juarez MD                            HPI:  Pt is a 78 y.o. female who has a history of HERNIATED LUMBAR [address] LEFT  with pain and limitations of activities of daily living who presents at this time for a Lum Bradley and Discectomy L4-L5 Left following the failure of conservative management. PMH:   Past Medical History:   Diagnosis Date    Anxiety     Eczema     Herpes labialis     History of recurrent UTIs     sex related    Hormone replacement therapy     HPV (human papilloma virus) infection     HTN (hypertension)     LBBB (left bundle branch block)        Body mass index is 24.03 kg/(m^2). BMI greater than 30 is classified as obesity. Medications upon admission :   Prior to Admission Medications   Prescriptions Last Dose Informant Patient Reported? Taking? B.ANI/L. ACI/L.SAL/L. PLAN/L.BRIDGETT (PROBIOTIC FORMULA PO) 2018 at Unknown time  Yes Yes   Sig: Take 1 Tab by mouth daily. cranberry extract 450 mg tab tablet 2018  Yes No   Sig: Take 450 mg by mouth two (2) times a day. cyanocobalamin, vitamin B-12, 3,000 mcg lozg 2018 at Unknown time  Yes Yes   Si,000 mcg by SubLINGual route daily. escitalopram oxalate (LEXAPRO) 10 mg tablet 2018 at 0530  Yes Yes   Sig: Take 10 mg by mouth daily. estradiol (ESTRACE) 0.5 mg tablet 2018 at 0530  Yes Yes   Sig: Take  by mouth daily.    losartan (COZAAR) 100 mg tablet 2018 at Unknown time  Yes Yes   Sig: Take 100 mg by mouth daily. medroxyPROGESTERone (PROVERA) 2.5 mg tablet 1/24/2018 at 0530  Yes Yes   Sig: Take 2.5 mg by mouth daily. nitrofurantoin, macrocrystal-monohydrate, (MACROBID) 100 mg capsule 1/23/2018 at Unknown time  No Yes   Sig: Take 1 Cap by mouth two (2) times a day for 7 days. Indications: ENTEROCOCCUS URINARY TRACT INFECTION   omeprazole (PRILOSEC) 20 mg capsule 1/23/2018 at Unknown time  Yes Yes   Sig: Take 20 mg by mouth daily as needed. triamcinolone acetonide (KENALOG) 0.1 % topical cream 1/10/2018  Yes No   Sig: Apply  to affected area two (2) times a day. use thin layer   valACYclovir (VALTREX) 1 gram tablet Unknown at Unknown time  Yes No   Sig: daily. zolpidem (AMBIEN) 10 mg tablet   Yes Yes   Sig: Take 1 Tab by mouth once for 1 dose. Max Daily Amount: 10 mg. Avoid taking while on Opioid pain med. Facility-Administered Medications: None        Allergies: Allergies   Allergen Reactions    Sulfa (Sulfonamide Antibiotics) Itching        Hospital Course: The patient underwent surgery. Intra-operative complications: None; patient tolerated the procedure well. Was taken to the PACU in stable condition and then transferred to the ortho floor. Perioperative Antibiotics:  Ancef     Postoperative Pain Management:  Oxycodone    Postoperative transfusions:    Number of units banked PRBCs =   none     Post Op complications: none    Hemoglobin at discharge:    Lab Results   Component Value Date/Time    HGB 12.2 01/16/2018 03:39 PM    INR 1.0 01/16/2018 03:39 PM       Dressing  - clean, dry and intact. No significant erythema or swelling. Neurovascular exam found to be within normal limits. Wound appears to be healing without any evidence of infection. Pt had a drain that was removed on POD# 1. Physical Therapy started on the day following surgery and participated in bed mobility, transfers and ambulation.         Gait:  Gait  Speed/Anika: [employer] decreased (<100 feet/min)  Step Length: Right shortened, Left shortened  Gait Abnormalities: Path deviations  Ambulation - Level of Assistance: Contact guard assistance, Stand-by asssistance  Distance (ft): 425 Feet (ft)  Assistive Device: Gait belt (HHA)  Rail Use: Both  Stairs - Level of Assistance: Contact guard assistance, Stand-by asssistance  Number of Stairs Trained: 8                   Discharged to: Home    Condition on Discharge:   stable and serious    Discharge instructions:    - Take pain medications as prescribed  - Resume pre hospital diet      - Discharge activity: activity as tolerated  - Ambulate as tolerated  - Wound Care Keep wound clean and dry. See discharge instruction sheet.  - Staples, if present, to be removed 10-14 days after surgery            -DISCHARGE MEDICATION LIST     Current Discharge Medication List      START taking these medications    Details   oxyCODONE-acetaminophen (PERCOCET) 5-325 mg per tablet Take 1 Tab by mouth every four (4) hours as needed for Pain. Max Daily Amount: 6 Tabs. Qty: 60 Tab, Refills: 0    Associated Diagnoses: Status post lumbar spine surgery for decompression of spinal cord      senna-docusate (SENNA PLUS) 8.6-50 mg per tablet Take 1 Tab by mouth two (2) times a day. Qty: 60 Tab, Refills: 0      polyethylene glycol (MIRALAX) 17 gram/dose powder Take 17 g by mouth daily for 15 days. Qty: 255 g, Refills: 0         CONTINUE these medications which have CHANGED    Details   zolpidem (AMBIEN) 10 mg tablet Take 1 Tab by mouth once for 1 dose. Max Daily Amount: 10 mg. Avoid taking while on Opioid pain med. Qty: 1 Tab, Refills: 0    Associated Diagnoses: Insomnia, unspecified type         CONTINUE these medications which have NOT CHANGED    Details   nitrofurantoin, macrocrystal-monohydrate, (MACROBID) 100 mg capsule Take 1 Cap by mouth two (2) times a day for 7 days.  Indications: ENTEROCOCCUS URINARY TRACT INFECTION  Qty: 14 Cap, Refills: 0 B. ANI/L. ACI/L.SAL/L. PLAN/L.BRIDGETT (PROBIOTIC FORMULA PO) Take 1 Tab by mouth daily. omeprazole (PRILOSEC) 20 mg capsule Take 20 mg by mouth daily as needed. escitalopram oxalate (LEXAPRO) 10 mg tablet Take 10 mg by mouth daily. losartan (COZAAR) 100 mg tablet Take 100 mg by mouth daily. estradiol (ESTRACE) 0.5 mg tablet Take  by mouth daily. medroxyPROGESTERone (PROVERA) 2.5 mg tablet Take 2.5 mg by mouth daily. cyanocobalamin, vitamin B-12, 3,000 mcg lozg 2,000 mcg by SubLINGual route daily. triamcinolone acetonide (KENALOG) 0.1 % topical cream Apply  to affected area two (2) times a day. use thin layer      valACYclovir (VALTREX) 1 gram tablet daily. cranberry extract 450 mg tab tablet Take 450 mg by mouth two (2) times a day.          STOP taking these medications       nitrofurantoin (MACRODANTIN) 100 mg capsule Comments:   Reason for Stopping:            per medical continuation form      -Follow up in office in 2 weeks      Signed:  Carlos Alberto Jolly  MSN, APRN, NP-C, [facility]  Orthopaedic Nurse Practitioner    1/25/2018  11:58 AM

## 2018-01-25 NOTE — PROGRESS NOTES
Ortho/ NeuroSurgery NP Note    s/p LUMBAR LAMINECTOMY AND DISCECTOMY L4-5 LEFT on 1/24/2018     Pt sitting in the chair eating breakfast. No complaints. VSS Afebrile. Wilson removed. Patient is voiding in adequate amounts. Labs  Lab Results   Component Value Date/Time    HGB 12.2 01/16/2018 03:39 PM        Body mass index is 24.03 kg/(m^2). BMI greater than 30 is classified as obesity and greater than 40 is classified as morbid obesity. Dressing c.d.i. Calves soft and supple; No pain with passive stretch  Sensation and motor intact  SCDs for mechanical DVT proph while in bed     PLAN:  1) PT BID- cleared this AM.   2) Plan d/c to home likely today.     Indu Hoffman NP

## 2018-01-25 NOTE — PROGRESS NOTES
Bedside and Verbal shift change report given to Amaury MACKAY RN (oncoming nurse) by Yvan Rogers (offgoing nurse). Report included the following information SBAR, Kardex, OR Summary, Procedure Summary, Intake/Output, MAR, Recent Results and Med Rec Status.

## 2018-01-25 NOTE — PROGRESS NOTES
Problem: Mobility Impaired (Adult and Pediatric)  Goal: *Acute Goals and Plan of Care (Insert Text)  Physical Therapy Goals  Initiated 1/24/2018    1. Patient will move from supine to sit and sit to supine , scoot up and down and roll side to side in bed with modified independence within 4 days. 2. Patient will perform sit to stand with independence within 4 days. 3. Patient will ambulate with independence for 250 feet with the least restrictive device within 4 days. 4. Patient will ascend/descend 4 stairs with 1 handrail(s) with independence within 4 days. 5. Patient will verbalize and demonstrate understanding of spinal precautions (No lifting greater than 10 lbs, log-roll technique; frequent repositioning as instructed) within 4 days. physical Therapy TREATMENT  Patient: Surendra Maddox (78 y.o. female)  Date: 1/25/2018  Diagnosis: HERNIATED LUMBAR DISC LEFT  Herniation of lumbar intervertebral disc Status post lumbar spine surgery for decompression of spinal cord  Procedure(s) (LRB):  LUMBAR LAMINECTOMY AND DISCECTOMY L4-5 LEFT (Left) 1 Day Post-Op  Precautions:  (no lifting 10#)  Chart, physical therapy assessment, plan of care and goals were reviewed. ASSESSMENT:  Pt cleared by nurse to mobilize. Pt received in bed supine. Pt reported being fearful of mobility due to fear of pain. Pt performed supine to sit at [facility] with cueing to maintain log roll. Pt performed is to stand transfer at [facility]. Pt with mild unsteadiness with initial stand. Pt ambulated 425ft with HHA at [facility]. Pt very stiff in trunk with mild path deviations. Pt trailed SPC due to minimal path deviations with ability to self correct, but could not get comfortable with sequencing. Pt with improved stability with time and awareness of mild unsteadiness. Unsteadiness may be due to stiffness of trunk with ambulation due to fear of pain.  Pt and  educated on just being safe and him  Just being near her for first few days when home with mobility. Pt ascended and descended 8 with both rails with step over step at CGA/SBA. Pt performed car transfer with no difficulties. Pt moving well with minimal pain. From physical therpay stand point pt cleared for discharge. Pt will benefit from HHPT to improve balance and core strength. Progression toward goals:  [x]      Improving appropriately and progressing toward goals  []      Improving slowly and progressing toward goals  []      Not making progress toward goals and plan of care will be adjusted     PLAN:  Patient continues to benefit from skilled intervention to address the above impairments. Continue treatment per established plan of care. Discharge Recommendations:  Home Health  Further Equipment Recommendations for Discharge:  none     SUBJECTIVE:   Patient stated It doesn't hurt as bad as I thought it would. Lam Alvarez   The patient stated 3/3 back precautions. Reviewed all 3 with patient.     OBJECTIVE DATA SUMMARY:   Critical Behavior:  Neurologic State: Alert  Orientation Level: Oriented X4  Cognition: Follows commands     Functional Mobility Training:  Bed Mobility:  Log    Supine to Sit: Stand-by asssistance  Sit to Supine: Contact guard assistance  Scooting: Contact guard assistance        Transfers:  Sit to Stand: Stand-by asssistance                                Balance:  Sitting: Intact  Standing: Intact  Standing - Static: Good  Standing - Dynamic : Fair (to good)  Ambulation/Gait Training:  Distance (ft): 425 Feet (ft)  Assistive Device: Gait belt (HHA)  Ambulation - Level of Assistance: Contact guard assistance;Stand-by asssistance        Gait Abnormalities: Path deviations              Speed/Anika: Pace decreased (<100 feet/min)  Step Length: Right shortened;Left shortened                    Stairs:  Number of Stairs Trained: 8  Stairs - Level of Assistance: Contact guard assistance;Stand-by [employer] Use: Both  Pain:  Pain Scale 1: Numeric (0 - 10)  Pain Intensity 1: 6  Pain Location 1: Back  Pain Orientation 1: Lower  Pain Description 1: Aching  Pain Intervention(s) 1: Medication (see MAR); Rest;Repositioned  Activity Tolerance:   Pt with moving with no safety concerns.    After treatment:   [x]  Patient left in no apparent distress sitting up in chair  []  Patient left in no apparent distress in bed  [x]  Call bell left within reach  [x]  Nursing notified  [x]  Caregiver present  []  Bed alarm activated    COMMUNICATION/COLLABORATION:   The patients plan of care was discussed with: Registered Nurse    Severo Barclay   Time Calculation: 23 mins

## 2018-07-27 ENCOUNTER — TELEPHONE (OUTPATIENT)
Dept: INTERNAL MEDICINE CLINIC | Age: 68
End: 2018-07-27

## 2018-07-27 NOTE — TELEPHONE ENCOUNTER
#[phone] pt states she lives in [facility] now and is here until 8-17-18 and wants to get a complete physical prior to leaving. Pt also needs a referral to gastro specialist.  Please call with names of who she can see.

## 2018-07-30 NOTE — TELEPHONE ENCOUNTER
Spoke with patient. Two pt identifiers confirmed. Patient states that she now lives in [place] and would like to be seen for her physical with Dr. Qing Willams while she is in town. Patient state that she does not need a pap, just her general physical.  Patient offered an appointment with Dr. Qing Willams on 08/07/18. Patient accepted appointment. Patient advised if anything changes or if unable to keep this appointment to call the office  Pt verbalized understanding of information discussed w/ no further questions at this time. Patient also states that she would like a referral to GI. Patient states that she has been having a lot of diarrhea and her stomach hurts every time that she eats. Advised patient that I will check with Dr. Qing Willams to see if we can refer her to someone, but that we may need to see her first, since she has not been seen in almost a year. Pt verbalized understanding of information discussed w/ no further questions at this time.

## 2018-07-30 NOTE — TELEPHONE ENCOUNTER
----- Message from [facility] sent at 7/27/2018  3:56 PM EDT -----  Regarding: Dr. Brittny Fish is returning a call from the office in regards to her needing an appointment for a follow-up, and needing a referral to see a stomach doctor.   Best contact number: [phone]      Message copied/pasted from [facility]

## 2018-07-31 NOTE — TELEPHONE ENCOUNTER
Ray Morales MD  Will Shorten, LPN        Caller: Unspecified (4 days ago,  3:03 PM)                     Patient needs to be seen in the office first prior to deciding if GI referral is warranted. She had a colonoscopy done last year. Left message for patient with the above message from Dr. Elis Ingram.

## 2018-08-07 ENCOUNTER — OFFICE VISIT (OUTPATIENT)
Dept: INTERNAL MEDICINE CLINIC | Age: 68
End: 2018-08-07

## 2018-08-07 ENCOUNTER — HOSPITAL ENCOUNTER (OUTPATIENT)
Dept: LAB | Age: 68
Discharge: HOME | End: 2018-08-07
Payer: MEDICARE

## 2018-08-07 VITALS
TEMPERATURE: 97.8 F | BODY MASS INDEX: 23.49 KG/M2 | HEART RATE: 86 BPM | WEIGHT: 141 LBS | RESPIRATION RATE: 18 BRPM | SYSTOLIC BLOOD PRESSURE: 165 MMHG | DIASTOLIC BLOOD PRESSURE: 78 MMHG | HEIGHT: 65 IN | OXYGEN SATURATION: 99 %

## 2018-08-07 DIAGNOSIS — R63.0 DECREASE IN APPETITE: ICD-10-CM

## 2018-08-07 DIAGNOSIS — R19.7 DIARRHEA, UNSPECIFIED TYPE: ICD-10-CM

## 2018-08-07 DIAGNOSIS — G72.9 MYOPATHY: Primary | ICD-10-CM

## 2018-08-07 DIAGNOSIS — Z00.00 MEDICARE ANNUAL WELLNESS VISIT, SUBSEQUENT: ICD-10-CM

## 2018-08-07 PROCEDURE — 82550 ASSAY OF CK (CPK): CPT

## 2018-08-07 PROCEDURE — 36415 COLL VENOUS BLD VENIPUNCTURE: CPT

## 2018-08-07 PROCEDURE — 80053 COMPREHEN METABOLIC PANEL: CPT

## 2018-08-07 PROCEDURE — 85025 COMPLETE CBC W/AUTO DIFF WBC: CPT

## 2018-08-07 NOTE — PROGRESS NOTES
Ms. 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     HTN: at age of 62 started on blood pressure medication. Sent to cardiologist due to abnormal EKG. Has a RBBB. Had a recent stress test - persantine and had a normal stress test.  Dr Lalita Henry cardiologist and advised to stay on same regimen   Losartan 100mg daily and tolerating medication well  Patient does lifestyle changes - exercise ( walks) and eats low-salt health    Chronic UTIs: Saw urologist. Sex related UTIs. Takes macrobid as needed  Takes cranberry  Followed by Dr Silas Daly    lexapro 10mg : started with depression . Started on early 46s and helped tremendously. Situational depression. On hormone replacement: per gynecologist / has been on hormones since 53. Has tried to go off of it and cannot. Has vaginal US scheduled - and normal  Christiano Lloyd is gynecologist      Bowels have changed: having loose bowels frequently, some times cannot make it to the restroom. Patient is on antibiotics frequently for UTIs   No abdominal pain weight loss, or blood in the stool  Normal colonoscopy 4 years ago.    Mother had colon cancer   On chronic antibiotics - may have bacterial overgrowth   Notes decreased appetite lost 4 lbs in 7 months      Review of systems:  10 systems reviewed and negative other than HPI     Past Medical History:   Diagnosis Date    Anxiety     Eczema     Herpes labialis     History of recurrent UTIs     sex related    Hormone replacement therapy     HPV (human papilloma virus) infection     HTN (hypertension)     LBBB (left bundle branch block)         Past Surgical History:   Procedure Laterality Date    HX ABDOMINAL LAPAROSCOPY      HX BREAST AUGMENTATION      HX COLONOSCOPY      HX TUBAL LIGATION      HX WRIST FRACTURE TX Right        Allergies   Allergen Reactions    Sulfa (Sulfonamide Antibiotics) Itching       Current Outpatient Prescriptions on File Prior to Visit   Medication Sig Dispense Refill    zolpidem (AMBIEN) 10 mg tablet Take 1 Tab by mouth once for 1 dose. Max Daily Amount: 10 mg. Avoid taking while on Opioid pain med. 1 Tab 0    senna-docusate (SENNA PLUS) 8.6-50 mg per tablet Take 1 Tab by mouth two (2) times a day. 60 Tab 0    B.ANI/L. ACI/L.SAL/L. PLAN/L.BRIDGETT (PROBIOTIC FORMULA PO) Take 1 Tab by mouth daily.  omeprazole (PRILOSEC) 20 mg capsule Take 20 mg by mouth daily as needed.  triamcinolone acetonide (KENALOG) 0.1 % topical cream Apply  to affected area two (2) times a day. use thin layer      escitalopram oxalate (LEXAPRO) 10 mg tablet Take 10 mg by mouth daily.  losartan (COZAAR) 100 mg tablet Take 100 mg by mouth daily.  valACYclovir (VALTREX) 1 gram tablet daily.  estradiol (ESTRACE) 0.5 mg tablet Take  by mouth daily.  medroxyPROGESTERone (PROVERA) 2.5 mg tablet Take 2.5 mg by mouth daily.  cyanocobalamin, vitamin B-12, 3,000 mcg lozg 2,000 mcg by SubLINGual route daily.  cranberry extract 450 mg tab tablet Take 450 mg by mouth two (2) times a day.  oxyCODONE-acetaminophen (PERCOCET) 5-325 mg per tablet Take 1 Tab by mouth every four (4) hours as needed for Pain. Max Daily Amount: 6 Tabs.  60 Tab 0     No current facility-administered medications on file prior to visit. family history includes Cancer in her mother. Social History     Social History    Marital status:      Spouse name: N/A    Number of children: N/A    Years of education: N/A     Occupational History    Not on file. Social History Main Topics    Smoking status: Never Smoker    Smokeless tobacco: Never Used    Alcohol use 8.4 oz/week     14 Glasses of wine per week      Comment: 5 nights/week, wine    Drug use: No    Sexual activity: Yes     Partners: Male     Other Topics Concern    Not on file     Social History Narrative       Visit Vitals    /78 (BP 1 Location: Right arm, BP Patient Position: Sitting)    Pulse 86    Temp 97.8 °F (36.6 °C) (Oral)    Resp 18    Ht 5' 5\" (1.651 m)    Wt 141 lb (64 kg)    SpO2 99%    BMI 23.46 kg/m2     General:  Well appearing female no acute distress  HEENT:   PERRL,normal conjunctiva. External ear and canals normal, TMs normal.  Hearing normal to voice. Nose without edema or discharge, normal septum. Lips, teeth, gums normal.  Oropharynx: no erythema, no exudates, no lesions, normal tongue. Neck:  Supple. Thyroid normal size, nontender, without nodules. No carotid bruit. No masses or lymphadenopathy  Respiratory: no respiratory distress,  no wheezing, no rhonchi, no rales. No chest wall tenderness. Cardiovascular:  RRR, normal S1S2, no murmur. Gastrointestinal: normal bowel sounds, soft, nontender, without masses. No hepatosplenomegaly. Extremities +2 pulses, no edema, normal sensation   Musculoskeletal:  Normal gait. Normal digits and nails. no atrophy, and no abnormal movement. Has difficulty getting up from chair without using hands mild weakness of proximal hip extensors  Skin:  No rash, no lesions, no ulcers. Skin warm, normal turgor, without induration or nodules. Neuro:  A and OX4, fluent speech, cranial nerves normal 2-12.   Sensation normal to light touch. DTR symmetrical  Able to get up from the chair without difficulty  Psych:  Normal affect      Lab Results   Component Value Date/Time    WBC 5.2 01/16/2018 03:39 PM    HGB 12.2 01/16/2018 03:39 PM    HCT 36.4 01/16/2018 03:39 PM    PLATELET 242 84/13/5457 03:39 PM    MCV 98.4 01/16/2018 03:39 PM     Lab Results   Component Value Date/Time    Sodium 140 01/16/2018 03:39 PM    Potassium 4.0 01/16/2018 03:39 PM    Chloride 106 01/16/2018 03:39 PM    CO2 28 01/16/2018 03:39 PM    Anion gap 6 01/16/2018 03:39 PM    Glucose 94 01/16/2018 03:39 PM    BUN 23 (H) 01/16/2018 03:39 PM    Creatinine 0.95 01/16/2018 03:39 PM    BUN/Creatinine ratio 24 (H) 01/16/2018 03:39 PM    GFR est AA >60 01/16/2018 03:39 PM    GFR est non-AA 59 (L) 01/16/2018 03:39 PM    Calcium 8.7 01/16/2018 03:39 PM     No results found for: CHOL, CHOLPOCT, CHOLX, CHLST, CHOLV, HDL, HDLPOC, LDL, LDLCPOC, LDLC, DLDLP, VLDLC, VLDL, TGLX, TRIGL, TRIGP, TGLPOCT, CHHD, CHHDX  No results found for: TSH, TSH2, TSH3, TSHP, TSHEXT, TSHEXT  Lab Results   Component Value Date/Time    Hemoglobin A1c 4.8 01/16/2018 03:39 PM     No results found for: Tyler Heman, XQVID3, XQVID, VD3RIA      [facility] - [facility] was normal   Glucose 83   3 D mammogram done on Wednesday   Colonoscopy done last one 2012 and normal                Assessment and Plan:     1. History of recurrent UTIs associated with sexual intercourse  -Takes Keflex as needed. -Denies current symptoms of UTI  -Has a cystoscopy scheduled with urologist    2 Essential hypertension  -Blood pressure is elevated today but reports normal at home   -recommend checking blood pressure with goal of less than  130/85 on average. Follow lo sodium diet. Given DASH diet guidelines. Recommend cardiovascular exercise regularly. Work on weight reduction. Call with blood pressure readings. - continue losartan    3.  Hormone replacement therapy  -Prescribed by gynecologist/patient aware of increased risk of clots    4. Herpes labialis  -Takes Valtrex as needed  -Discussed she must not of taking Valtrex in the week prior to getting shingles vaccine    5. Loose stools  -Family history maternal of colon cancer at the age of 76. Patient reports normal colonoscopy 4 years ago/ given change in stool habits I would like her to see a gastroenterologist.  Possibly bacterial overgrowth given recurrent use of Keflex for UTI  - REFERRAL TO GASTROENTEROLOGY    6. Pelvic girdle weakness  - mild weakness noted on exam the patient has difficulty going up the stairs. She had a biopsy that was concerning for inflammatory myositis - I am referring her to neuromuscular [facility] Dr Beth Crowell  -     Follow-up Disposition:  Return in about 6 months (around 2/7/2019) for HTN, diarrhea. Ray Morales MD    This is the Subsequent Medicare Annual Wellness Exam, performed 12 months or more after the Initial AWV or the last Subsequent AWV    I have reviewed the patient's medical history in detail and updated the computerized patient record. Depression Risk Factor Screening:     PHQ over the last two weeks 8/7/2018   Little interest or pleasure in doing things Not at all   Feeling down, depressed, irritable, or hopeless Not at all   Total Score PHQ 2 0     Alcohol Risk Factor Screening: You do not drink alcohol or very rarely. Functional Ability and Level of Safety:   Hearing Loss  Hearing is good. Activities of Daily Living  The home contains: no safety equipment. Patient does total self care    Fall Risk  Fall Risk Assessment, last 12 mths 8/7/2018   Able to walk? Yes   Fall in past 12 months?  No       Abuse Screen  Patient is not abused    Cognitive Screening   Evaluation of Cognitive Function:  Has your family/caregiver stated any concerns about your memory: no  Normal    Patient Care Team   Patient Care Team:  Ray Morales MD as PCP - General (Internal Medicine)  Perley Klinefelter, MD (Cardiology)  Kandi Haro MD (Urology)    Assessment/Plan   Education and counseling provided:  Are appropriate based on today's review and evaluation         Diagnoses and all orders for this visit:    1. Myopathy: with decreased strength in girdle muslces difficulty going up the stairs. 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 I am referring her to neuromuscular specialist.  She is up todate on cancer screenings. Given her increase in BMs and decreased appetite I am referring her to GI for possible colonoscopy ( mother  of colon cancer)   -     CK  -     REFERRAL TO NEUROLOGY  -     METABOLIC PANEL, COMPREHENSIVE  -     CBC WITH AUTOMATED DIFF    2. Decrease in appetite    3. Diarrhea, unspecified type  -     METABOLIC PANEL, COMPREHENSIVE  -     CBC WITH AUTOMATED DIFF  -     GASTROINTESTINAL PROFILE, STOOL, PCR  -     Felix Michel [facility]    4. Medicare annual wellness visit, subsequent    5.  Spinal stenosis and herniated lumbar disc: this was not severe and patient underwent laminectomy

## 2018-08-07 NOTE — PATIENT INSTRUCTIONS
Make appointment with neuromuscular for further evaluation of muscle weakness     Make appointment with GI for colonoscopy    Try stopping metamucil     Bring in a stool sample     We are doing blood work today      [facility] Visit, Female    The best way to live healthy is to have a lifestyle where you eat a well-balanced diet, exercise regularly, limit alcohol use, and quit all forms of tobacco/nicotine, if applicable. Regular preventive services are another way to keep healthy. Preventive services (vaccines, screening tests, monitoring & exams) can help personalize your care plan, which helps you manage your own care. Screening tests can find health problems at the earliest stages, when they are easiest to treat. [facility] follows the current, evidence-based guidelines published by the [facility] ([facility]) when recommending preventive services for our patients. Because we follow these guidelines, sometimes recommendations change over time as research supports it. (For example, mammograms used to be recommended annually. Even though Medicare will still pay for an annual mammogram, the newer guidelines recommend a mammogram every two years for women of average risk.)    Of course, you and your provider may decide to screen more often for some diseases, based on your risk and co-morbidities (chronic disease you are already diagnosed with). Preventive services for you include:    - Medicare offers their members a free annual wellness visit, which is time for you and your primary care provider to discuss and plan for your preventive service needs. Take advantage of this benefit every year!    -All people over age 72 should receive the recommended pneumonia vaccines. Current USPSTF guidelines recommend a series of two vaccines for the best pneumonia protection.     -All adults should have a yearly flu vaccine and a tetanus vaccine every 10 years.  All adults age 61 years should receive a shingles vaccine once in their lifetime.      -A bone mass density test is recommended when a woman turns 65 to screen for osteoporosis. This test is only recommended once as a screening. Some providers will use this same test as a disease monitoring tool if you already have osteoporosis. -All adults age 38-68 years who are overweight should have a diabetes screening test once every three years.     -Other screening tests & preventive services for persons with diabetes include: an eye exam to screen for diabetic retinopathy, a kidney function test, a foot exam, and stricter control over your cholesterol.     -Cardiovascular screening for adults with routine risk involves an electrocardiogram (ECG) at intervals determined by the provider.     -Colorectal cancer screenings should be done for adults age 54-65 years with normal risk. There are a number of acceptable methods of screening for this type of cancer. Each test has its own benefits and drawbacks. Discuss with your provider what is most appropriate for you during your annual wellness visit. The different tests include: colonoscopy (considered the best screening method), a fecal occult blood test, a fecal DNA test, and sigmoidoscopy. -Breast cancer screenings are recommended every other year for women of normal risk age 54-69 years.     -Cervical cancer screenings for women over age 72 are only recommended with certain risk factors.     -All adults born between [facility] should be screened once for Hepatitis C.      Here is a list of your current Health Maintenance items (your personalized list of preventive services) with a due date:  Health Maintenance Due   Topic Date Due    Shingles Vaccine  07/07/2011    Glaucoma Screening   09/07/2016    Bone Mineral Density   09/07/2016    Annual Well Visit  03/14/2018    Flu Vaccine  08/01/2018       Medicare Wellness Visit, Female    The best way to live healthy is to have a lifestyle where you eat a well-balanced diet, exercise regularly, limit alcohol use, and quit all forms of tobacco/nicotine, if applicable. Regular preventive services are another way to keep healthy. Preventive services (vaccines, screening tests, monitoring & exams) can help personalize your care plan, which helps you manage your own care. Screening tests can find health problems at the earliest stages, when they are easiest to treat. [address] follows the current, evidence-based guidelines published by the [facility] ([facility]) when recommending preventive services for our patients. Because we follow these guidelines, sometimes recommendations change over time as research supports it. (For example, mammograms used to be recommended annually. Even though Medicare will still pay for an annual mammogram, the newer guidelines recommend a mammogram every two years for women of average risk.)    Of course, you and your provider may decide to screen more often for some diseases, based on your risk and co-morbidities (chronic disease you are already diagnosed with). Preventive services for you include:    - Medicare offers their members a free annual wellness visit, which is time for you and your primary care provider to discuss and plan for your preventive service needs. Take advantage of this benefit every year!    -All people over age 72 should receive the recommended pneumonia vaccines. Current USPSTF guidelines recommend a series of two vaccines for the best pneumonia protection.     -All adults should have a yearly flu vaccine and a tetanus vaccine every 10 years. All adults age 61 years should receive a shingles vaccine once in their lifetime.      -A bone mass density test is recommended when a woman turns 65 to screen for osteoporosis. This test is only recommended once as a screening.  Some providers will use this same test as a disease monitoring tool if you already have osteoporosis. -All adults age 38-68 years who are overweight should have a diabetes screening test once every three years.     -Other screening tests & preventive services for persons with diabetes include: an eye exam to screen for diabetic retinopathy, a kidney function test, a foot exam, and stricter control over your cholesterol.     -Cardiovascular screening for adults with routine risk involves an electrocardiogram (ECG) at intervals determined by the provider.     -Colorectal cancer screenings should be done for adults age 54-65 years with normal risk. There are a number of acceptable methods of screening for this type of cancer. Each test has its own benefits and drawbacks. Discuss with your provider what is most appropriate for you during your annual wellness visit. The different tests include: colonoscopy (considered the best screening method), a fecal occult blood test, a fecal DNA test, and sigmoidoscopy. -Breast cancer screenings are recommended every other year for women of normal risk age 54-69 years.     -Cervical cancer screenings for women over age 72 are only recommended with certain risk factors.     -All adults born between [facility] should be screened once for Hepatitis C.      Here is a list of your current Health Maintenance items (your personalized list of preventive services) with a due date:  Health Maintenance Due   Topic Date Due    Shingles Vaccine  07/07/2011    Glaucoma Screening   09/07/2016    Bone Mineral Density   09/07/2016    Annual Well Visit  Done today     Flu Vaccine  08/01/2018

## 2018-08-07 NOTE — MR AVS SNAPSHOT
[address] [place], [place] [id number] Patient: Varsha MRN: [id number] Visit Information Date & Time Provider Department Dept. Phone Encounter #  
 8/7/2018 11:00 AM Natasha Cifuentes, [facility] [place] [id number] Follow-up Instructions Return in about 6 months (around 2/7/2019) for HTN, diarrhea. Upcoming Health Maintenance Date Due ZOSTER VACCINE AGE 60> 7/7/2011 GLAUCOMA SCREENING Q2Y 9/7/2016 Bone Densitometry (Dexa) Screening 9/7/2016 MEDICARE YEARLY EXAM 3/14/2018 Influenza Age 5 to Adult 8/1/2018 Pneumococcal 65+ Low/Medium Risk (2 of 2 - PPSV23) 8/11/2018 BREAST CANCER SCRN MAMMOGRAM 8/11/2019 DTaP/Tdap/Td series (2 - Td) 8/10/2022 COLONOSCOPY 5/13/2023 Allergies as of 8/7/2018  Review Complete On: 8/7/2018 By: Contreras Parmar Severity Noted Reaction Type Reactions Sulfa (Sulfonamide Antibiotics)  10/09/2017    Itching Current Immunizations  Never Reviewed Name Date Pneumococcal Conjugate (PCV-13) 8/11/2017  1:01 PM  
  
 Not reviewed this visit You Were Diagnosed With   
  
 Codes Comments Myopathy    -  Primary ICD-10-CM: G72.9 ICD-9-CM: 359.9 Decrease in appetite     ICD-10-CM: R63.0 ICD-9-CM: 783.0 Diarrhea, unspecified type     ICD-10-CM: R19.7 ICD-9-CM: 787.91 Medicare annual wellness visit, subsequent     ICD-10-CM: Z00.00 ICD-9-CM: V70.0 Vitals BP Pulse Temp Resp Height(growth percentile) Weight(growth percentile) 165/78 (BP 1 Location: Right arm, BP Patient Position: Sitting) 86 97.8 °F (36.6 °C) (Oral) 18 5' 5\" (1.651 m) 141 lb (64 kg) SpO2 BMI OB Status Smoking Status 99% 23.46 kg/m2 Menopause Never Smoker Vitals History BMI and BSA Data Body Mass Index Body Surface Area  
 23.46 kg/m 2 1.71 m 2 Preferred Pharmacy Pharmacy Name Phone [address] [place] [address] [phone] Your Updated Medication List  
  
   
This list is accurate as of 8/7/18 11:55 AM.  Always use your most recent med list.  
  
  
  
  
 cranberry extract 450 mg Tab tablet Take 450 mg by mouth two (2) times a day. cyanocobalamin (vitamin B-12) 3,000 mcg Lozg  
2,000 mcg by SubLINGual route daily. escitalopram oxalate 10 mg tablet Commonly known as:  Tristan Cristhian Take 10 mg by mouth daily. estradiol 0.5 mg tablet Commonly known as:  ESTRACE Take  by mouth daily. losartan 100 mg tablet Commonly known as:  COZAAR Take 100 mg by mouth daily. medroxyPROGESTERone 2.5 mg tablet Commonly known as:  PROVERA Take 2.5 mg by mouth daily. omeprazole 20 mg capsule Commonly known as:  PRILOSEC Take 20 mg by mouth daily as needed. oxyCODONE-acetaminophen 5-325 mg per tablet Commonly known as:  PERCOCET Take 1 Tab by mouth every four (4) hours as needed for Pain. Max Daily Amount: 6 Tabs. PROBIOTIC FORMULA PO Take 1 Tab by mouth daily. senna-docusate 8.6-50 mg per tablet Commonly known as:  SENNA PLUS Take 1 Tab by mouth two (2) times a day. triamcinolone acetonide 0.1 % topical cream  
Commonly known as:  KENALOG Apply  to affected area two (2) times a day. use thin layer  
  
 valACYclovir 1 gram tablet Commonly known as:  VALTREX  
daily. zolpidem 10 mg tablet Commonly known as:  AMBIEN Take 1 Tab by mouth once for 1 dose. Max Daily Amount: 10 mg. Avoid taking while on Opioid pain med. We Performed the Following CBC WITH AUTOMATED DIFF [52607 CPT(R)] CK [id number] CPT(R)] GASTROINTESTINAL PROFILE, STOOL, PCR [id number] Custom] METABOLIC PANEL, COMPREHENSIVE [30706 CPT(R)] REFERRAL TO GASTROENTEROLOGY [QOS73 Custom] REFERRAL TO NEUROLOGY [OID53 Custom] Comments: Myopathy, with muscle biopsy done FINAL PATHOLOGIC DIAGNOSIS 
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 
-Type 2 fiber atrophy Needs further evaluation Follow-up Instructions Return in about 6 months (around 2/7/2019) for HTN, diarrhea. Referral Information Referral ID Referred By Referred To  
  
 [id number] MD Celio Ortiz Val Bro Phone: [phone] Fax: [phone] Visits Status Start Date End Date 1 Open 8/7/18 8/7/19 If your referral has a status of pending review or denied, additional information will be sent to support the outcome of this decision. Referral ID Referred By Referred To  
 [id number] [address] [facility], [facility] [address] [place], [address] [facility] Visits Status Start Date End Date 1 New Request 8/7/18 8/7/19 If your referral has a status of pending review or denied, additional information will be sent to support the outcome of this decision. Patient Instructions Make appointment with neuromuscular for further evaluation of muscle weakness Make appointment with GI for colonoscopy Try stopping metamucil Bring in a stool sample We are doing blood work today Medicare Wellness Visit, Female The best way to live healthy is to have a lifestyle where you eat a well-balanced diet, exercise regularly, limit alcohol use, and quit all forms of tobacco/nicotine, if applicable. Regular preventive services are another way to keep healthy. Preventive services (vaccines, screening tests, monitoring & exams) can help personalize your care plan, which helps you manage your own care. Screening tests can find health problems at the earliest stages, when they are easiest to treat.  
  
[facility] follows the current, evidence-based guidelines published by the [place] (USPSTF) when recommending preventive services for our patients. Because we follow these guidelines, sometimes recommendations change over time as research supports it. (For example, mammograms used to be recommended annually. Even though Medicare will still pay for an annual mammogram, the newer guidelines recommend a mammogram every two years for women of average risk.) Of course, you and your provider may decide to screen more often for some diseases, based on your risk and co-morbidities (chronic disease you are already diagnosed with). Preventive services for you include: - Medicare offers their members a free annual wellness visit, which is time for you and your primary care provider to discuss and plan for your preventive service needs. Take advantage of this benefit every year! 
 
-All people over age 72 should receive the recommended pneumonia vaccines. Current USPSTF guidelines recommend a series of two vaccines for the best pneumonia protection.  
 
-All adults should have a yearly flu vaccine and a tetanus vaccine every 10 years. All adults age 61 years should receive a shingles vaccine once in their lifetime.   
 
-A bone mass density test is recommended when a woman turns 65 to screen for osteoporosis. This test is only recommended once as a screening. Some providers will use this same test as a disease monitoring tool if you already have osteoporosis. -All adults age 38-68 years who are overweight should have a diabetes screening test once every three years.  
 
-Other screening tests & preventive services for persons with diabetes include: an eye exam to screen for diabetic retinopathy, a kidney function test, a foot exam, and stricter control over your cholesterol.  
 
-Cardiovascular screening for adults with routine risk involves an electrocardiogram (ECG) at intervals determined by the provider. -Colorectal cancer screenings should be done for adults age 54-65 years with normal risk. There are a number of acceptable methods of screening for this type of cancer. Each test has its own benefits and drawbacks. Discuss with your provider what is most appropriate for you during your annual wellness visit. The different tests include: colonoscopy (considered the best screening method), a fecal occult blood test, a fecal DNA test, and sigmoidoscopy. -Breast cancer screenings are recommended every other year for women of normal risk age 54-69 years.  
 
-Cervical cancer screenings for women over age 72 are only recommended with certain risk factors.  
 
-All adults born between [facility] should be screened once for Hepatitis C. Here is a list of your current Health Maintenance items (your personalized list of preventive services) with a due date: 
Health Maintenance Due Topic Date Due  Shingles Vaccine  07/07/2011  Glaucoma Screening   09/07/2016  Bone Mineral Density   09/07/2016 [facility] Annual Well Visit  03/14/2018  Flu Vaccine  08/01/2018 Medicare Wellness Visit, Female The best way to live healthy is to have a lifestyle where you eat a well-balanced diet, exercise regularly, limit alcohol use, and quit all forms of tobacco/nicotine, if applicable. Regular preventive services are another way to keep healthy. Preventive services (vaccines, screening tests, monitoring & exams) can help personalize your care plan, which helps you manage your own care. Screening tests can find health problems at the earliest stages, when they are easiest to treat. [facility] follows the current, evidence-based guidelines published by the [facility] Reginaldo Shipley (USPSTF) when recommending preventive services for our patients.  Because we follow these guidelines, sometimes recommendations change over time as research supports it. (For example, mammograms used to be recommended annually. Even though Medicare will still pay for an annual mammogram, the newer guidelines recommend a mammogram every two years for women of average risk.) Of course, you and your provider may decide to screen more often for some diseases, based on your risk and co-morbidities (chronic disease you are already diagnosed with). Preventive services for you include: - Medicare offers their members a free annual wellness visit, which is time for you and your primary care provider to discuss and plan for your preventive service needs. Take advantage of this benefit every year! 
 
-All people over age 72 should receive the recommended pneumonia vaccines. Current USPSTF guidelines recommend a series of two vaccines for the best pneumonia protection.  
 
-All adults should have a yearly flu vaccine and a tetanus vaccine every 10 years. All adults age 61 years should receive a shingles vaccine once in their lifetime.   
 
-A bone mass density test is recommended when a woman turns 65 to screen for osteoporosis. This test is only recommended once as a screening. Some providers will use this same test as a disease monitoring tool if you already have osteoporosis. -All adults age 38-68 years who are overweight should have a diabetes screening test once every three years.  
 
-Other screening tests & preventive services for persons with diabetes include: an eye exam to screen for diabetic retinopathy, a kidney function test, a foot exam, and stricter control over your cholesterol.  
 
-Cardiovascular screening for adults with routine risk involves an electrocardiogram (ECG) at intervals determined by the provider.  
 
-Colorectal cancer screenings should be done for adults age 54-65 years with normal risk. There are a number of acceptable methods of screening for this type of cancer. Each test has its own benefits and drawbacks. Discuss with your provider what is most appropriate for you during your annual wellness visit. The different tests include: colonoscopy (considered the best screening method), a fecal occult blood test, a fecal DNA test, and sigmoidoscopy. -Breast cancer screenings are recommended every other year for women of normal risk age 54-69 years.  
 
-Cervical cancer screenings for women over age 72 are only recommended with certain risk factors.  
 
-All adults born between [facility] should be screened once for Hepatitis C. Here is a list of your current Health Maintenance items (your personalized list of preventive services) with a due date: 
Health Maintenance Due Topic Date Due  Shingles Vaccine  07/07/2011  Glaucoma Screening   09/07/2016  Bone Mineral Density   09/07/2016  Annual Well Visit  Done today  Flu Vaccine  08/01/2018 Introducing [facility]! [facility] introduces [employer] patient portal. Now you can access parts of your medical record, email your doctor's office, and request medication refills online. 1. In your internet browser, go to [email or web address] 2. Click on the First Time User? Click Here link in the Sign In box. You will see the New Member Sign Up page. 3. Enter your [employer] Access Code exactly as it appears below. You will not need to use this code after youve completed the sign-up process. If you do not sign up before the expiration date, you must request a new code. · [employer] Access Code: [email or web address] Expires: 11/5/2018 11:55 AM 
 
4. Enter the last four digits of your Social Security Number (xxxx) and Date of Birth (mm/dd/yyyy) as indicated and click Submit. You will be taken to the next sign-up page. 5. Create a [employer] ID. This will be your [employer] login ID and cannot be changed, so think of one that is secure and easy to remember. 6. Create a [employer] password. You can change your password at any time. 7. Enter your Password Reset Question and Answer. This can be used at a later time if you forget your password. 8. Enter your e-mail address. You will receive e-mail notification when new information is available in [address] 9. Click Sign Up. You can now view and download portions of your medical record. 10. Click the Download Summary menu link to download a portable copy of your medical information. If you have questions, please visit the Frequently Asked Questions section of the [employer] website. Remember, [employer] is NOT to be used for urgent needs. For medical emergencies, dial 911. Now available from your iPhone and Android! Please provide this summary of care documentation to your next provider. Your primary care clinician is listed as FELY HILL [address] [place] [address]. If you have any questions after today's visit, please call [phone].

## 2018-08-07 NOTE — PROGRESS NOTES
Reviewed record in preparation for visit and have obtained necessary documentation. Identified pt with two pt identifiers(name and ). Chief Complaint   Patient presents with    Complete Physical    Abdominal Pain    Diarrhea         Health Maintenance Due   Topic Date Due    Stool testing for trace blood  2001    Shingles Vaccine  2011    Glaucoma Screening   2016    Bone Mineral Density   2016    Annual Well Visit  2018    Flu Vaccine  2018       Ms. Weiner has a reminder for a \"due or due soon\" health maintenance. I have asked that she discuss this further with her primary care provider for follow-up on this health maintenance. Coordination of Care Questionnaire:  :     1) Have you been to an emergency room, urgent care clinic since your last visit? no   Hospitalized since your last visit? no             2) Have you seen or consulted any other health care providers outside of [facility] since your last visit? no  (Include any pap smears or colon screenings in this section.)    3) In the event something were to happen to you and you were unable to speak on your behalf, do you have an Advance Directive/ Living Will in place stating your wishes? NO    Do you have an Advance Directive on file? no    4) Are you interested in receiving information on Advance Directives? NO    Patient is accompanied by self I have received verbal consent from [facility] to discuss any/all medical information while they are present in the room.

## 2018-08-08 ENCOUNTER — HOSPITAL ENCOUNTER (OUTPATIENT)
Dept: LAB | Age: 68
Discharge: HOME | End: 2018-08-08
Payer: MEDICARE

## 2018-08-08 LAB
ALBUMIN SERPL-MCNC: 4.2 G/DL (ref 3.6–4.8)
ALBUMIN/GLOB SERPL: 1.6 {RATIO} (ref 1.2–2.2)
ALP SERPL-CCNC: 39 IU/L (ref 39–117)
ALT SERPL-CCNC: 20 IU/L (ref 0–32)
AST SERPL-CCNC: 27 IU/L (ref 0–40)
BASOPHILS # BLD AUTO: 0 X10E3/UL (ref 0–0.2)
BASOPHILS NFR BLD AUTO: 0 %
BILIRUB SERPL-MCNC: 0.6 MG/DL (ref 0–1.2)
BUN SERPL-MCNC: 11 MG/DL (ref 8–27)
BUN/CREAT SERPL: 22 (ref 12–28)
CALCIUM SERPL-MCNC: 9.2 MG/DL (ref 8.7–10.3)
CHLORIDE SERPL-SCNC: 100 MMOL/L (ref 96–106)
CK SERPL-CCNC: 166 U/L (ref 24–173)
CO2 SERPL-SCNC: 24 MMOL/L (ref 20–29)
CREAT SERPL-MCNC: 0.49 MG/DL (ref 0.57–1)
EOSINOPHIL # BLD AUTO: 0 X10E3/UL (ref 0–0.4)
EOSINOPHIL NFR BLD AUTO: 1 %
ERYTHROCYTE [DISTWIDTH] IN BLOOD BY AUTOMATED COUNT: 13 % (ref 12.3–15.4)
GLOBULIN SER CALC-MCNC: 2.6 G/DL (ref 1.5–4.5)
GLUCOSE SERPL-MCNC: 88 MG/DL (ref 65–99)
HCT VFR BLD AUTO: 37.1 % (ref 34–46.6)
HGB BLD-MCNC: 11.9 G/DL (ref 11.1–15.9)
IMM GRANULOCYTES # BLD: 0 X10E3/UL (ref 0–0.1)
IMM GRANULOCYTES NFR BLD: 0 %
LYMPHOCYTES # BLD AUTO: 0.7 X10E3/UL (ref 0.7–3.1)
LYMPHOCYTES NFR BLD AUTO: 23 %
MCH RBC QN AUTO: 33.1 PG (ref 26.6–33)
MCHC RBC AUTO-ENTMCNC: 32.1 G/DL (ref 31.5–35.7)
MCV RBC AUTO: 103 FL (ref 79–97)
MONOCYTES # BLD AUTO: 0.4 X10E3/UL (ref 0.1–0.9)
MONOCYTES NFR BLD AUTO: 12 %
NEUTROPHILS # BLD AUTO: 1.8 X10E3/UL (ref 1.4–7)
NEUTROPHILS NFR BLD AUTO: 64 %
PLATELET # BLD AUTO: 256 X10E3/UL (ref 150–379)
POTASSIUM SERPL-SCNC: 4.3 MMOL/L (ref 3.5–5.2)
PROT SERPL-MCNC: 6.8 G/DL (ref 6–8.5)
RBC # BLD AUTO: 3.59 X10E6/UL (ref 3.77–5.28)
SODIUM SERPL-SCNC: 139 MMOL/L (ref 134–144)
WBC # BLD AUTO: 2.9 X10E3/UL (ref 3.4–10.8)

## 2018-08-08 PROCEDURE — 87507 IADNA-DNA/RNA PROBE TQ 12-25: CPT

## 2018-08-09 NOTE — PROGRESS NOTES
2 pt identifiers verified- pt informed per Dr Maria Isabel Burciaga kidney and liver function   The muscle break down enzyme is now normal

## 2018-08-11 LAB
ADENOVIRUS F 40/41: NOT DETECTED
ASTROVIRUS: NOT DETECTED
C DIFFICILE TOXIN A/B: NOT DETECTED
CAMPYLOBACTER: NOT DETECTED
CRYPTOSPORIDIUM, CRYPTOSPORIDIUM: NOT DETECTED
CYCLOSPORA CAYETANENSIS: NOT DETECTED
E COLI O157: NORMAL
ENTAMOEBA HISTOLYTICA: NOT DETECTED
ENTEROAGGREGATIVE E COLI: NOT DETECTED
ENTEROPATHOGENIC E COLI (EPEC), EPEC: NOT DETECTED
ENTEROTOXIGENIC E COLI (ETEC), ETEC: NOT DETECTED
GIARDIA LAMBLIA: NOT DETECTED
NOROVIRUS GI/GII: NOT DETECTED
PLESIOMONAS SHIGELLOIDES: NOT DETECTED
ROTAVIRUS A: NOT DETECTED
SALMONELLA: NOT DETECTED
SAPOVIRUS: NOT DETECTED
SHIGA-TOXIN-PRODUCING E COLI: NOT DETECTED
SHIGELLA/ENTEROINVASIVE E COLI (EIEC), EIEC: NOT DETECTED
VIBRIO CHOLERAE: NOT DETECTED
VIBRIO: NOT DETECTED
YERSINIA ENTEROCOLITICA: NOT DETECTED

## 2018-08-13 NOTE — PROGRESS NOTES
Stool study was negative for infectious etiology - patient should make appointment with gastroenterologist as discussed during the last appointment

## 2018-08-14 NOTE — PROGRESS NOTES
Spoke with patient. Two pt identifiers confirmed. Patient notified per Dr. Ines Closs that her recent stool study was negative for infectious etiology - patient should make appointment with gastroenterologist as discussed during the last appointment. Pt verbalized understanding of information discussed w/ no further questions at this time.

## (undated) DEVICE — SKIN MARKER,REGULAR TIP WITH RULER AND LABELS: Brand: DEVON

## (undated) DEVICE — TRAY PREP DRY W/ PREM GLV 2 APPL 6 SPNG 2 UNDPD 1 OVERWRAP

## (undated) DEVICE — SUTURE VCRL SZ 3-0 L18IN ABSRB UD CP-2 L26MM 1/2 CIR REV J761D

## (undated) DEVICE — COVER,MAYO STAND,STERILE: Brand: MEDLINE

## (undated) DEVICE — BONE WAX WHITE: Brand: BONE WAX WHITE

## (undated) DEVICE — FLOSEAL HEMOSTATIC MATRIX, 5 ML: Brand: FLOSEAL

## (undated) DEVICE — DEVON™ KNEE AND BODY STRAP 60" X 3" (1.5 M X 7.6 CM): Brand: DEVON

## (undated) DEVICE — INFECTION CONTROL KIT SYS

## (undated) DEVICE — SUTURE MCRYL SZ 4-0 L27IN ABSRB UD L19MM PS-2 1/2 CIR PRIM Y426H

## (undated) DEVICE — MEDI-VAC NON-CONDUCTIVE SUCTION TUBING: Brand: CARDINAL HEALTH

## (undated) DEVICE — NEEDLE HYPO 25GA L1.5IN BVL ORIENTED ECLIPSE

## (undated) DEVICE — DEVICE TRNSF SPIK STL 2008S] MICROTEK MEDICAL INC]

## (undated) DEVICE — SUTURE VCRL SZ 0 L45CM ABSRB VLT OS-6 L36.4MM 1/2 CIR REV J711T

## (undated) DEVICE — SUT PROL 6-0 18IN BV1 DA BLU --

## (undated) DEVICE — Z CONVERTED USE 2107985 COVER FLROSCP W36XL28IN 4 SIDE ADH

## (undated) DEVICE — STERILE POLYISOPRENE POWDER-FREE SURGICAL GLOVES: Brand: PROTEXIS

## (undated) DEVICE — 3000CC GUARDIAN II: Brand: GUARDIAN

## (undated) DEVICE — WATERPROOF, BACTERIA PROOF DRESSING WITH ABSORBENT SEE THROUGH PAD: Brand: OPSITE POST-OP VISIBLE 10X8CM CTN 20

## (undated) DEVICE — PILLOW POS AD L7IN R FOAM HD REST INTUB SLOT DISP

## (undated) DEVICE — LAMINECTOMY RICHMOND-LF: Brand: MEDLINE INDUSTRIES, INC.

## (undated) DEVICE — CORD ELECSURG BPLR 12 FT DISP [810T818750] [ADLER INSTRUMENT CO]

## (undated) DEVICE — CONTAINER,SPECIMEN,OR STERILE,4OZ: Brand: MEDLINE

## (undated) DEVICE — HANDLE LT SNAP ON ULT DURABLE LENS FOR TRUMPF ALC DISPOSABLE

## (undated) DEVICE — NEEDLE HYPO 18GA L1.5IN PNK S STL HUB POLYPR SHLD REG BVL

## (undated) DEVICE — SYRINGE MED 20ML STD CLR PLAS LUERLOCK TIP N CTRL DISP

## (undated) DEVICE — SOLUTION IV 1000ML 0.9% SOD CHL